# Patient Record
Sex: MALE | Race: WHITE | Employment: OTHER | URBAN - METROPOLITAN AREA
[De-identification: names, ages, dates, MRNs, and addresses within clinical notes are randomized per-mention and may not be internally consistent; named-entity substitution may affect disease eponyms.]

---

## 2017-01-01 ENCOUNTER — APPOINTMENT (OUTPATIENT)
Dept: CV DIAGNOSTICS | Facility: HOSPITAL | Age: 67
DRG: 146 | End: 2017-01-01
Attending: HOSPITALIST
Payer: MEDICARE

## 2017-01-01 ENCOUNTER — APPOINTMENT (OUTPATIENT)
Dept: CV DIAGNOSTICS | Facility: HOSPITAL | Age: 67
End: 2017-01-01
Attending: INTERNAL MEDICINE
Payer: MEDICARE

## 2017-01-01 ENCOUNTER — HOSPITAL ENCOUNTER (EMERGENCY)
Facility: HOSPITAL | Age: 67
Discharge: HOME | End: 2017-01-01
Attending: EMERGENCY MEDICINE
Payer: MEDICARE

## 2017-01-01 ENCOUNTER — APPOINTMENT (OUTPATIENT)
Dept: GENERAL RADIOLOGY | Facility: HOSPITAL | Age: 67
DRG: 146 | End: 2017-01-01
Attending: EMERGENCY MEDICINE
Payer: MEDICARE

## 2017-01-01 ENCOUNTER — APPOINTMENT (OUTPATIENT)
Dept: CT IMAGING | Facility: HOSPITAL | Age: 67
DRG: 146 | End: 2017-01-01
Attending: INTERNAL MEDICINE
Payer: MEDICARE

## 2017-01-01 ENCOUNTER — HOSPITAL ENCOUNTER (INPATIENT)
Facility: HOSPITAL | Age: 67
LOS: 4 days | Discharge: HOME | DRG: 146 | End: 2017-01-01
Attending: HOSPITALIST | Admitting: HOSPITALIST
Payer: MEDICARE

## 2017-01-01 ENCOUNTER — APPOINTMENT (OUTPATIENT)
Dept: ULTRASOUND IMAGING | Facility: HOSPITAL | Age: 67
DRG: 146 | End: 2017-01-01
Attending: INTERNAL MEDICINE
Payer: MEDICARE

## 2017-01-01 ENCOUNTER — APPOINTMENT (OUTPATIENT)
Dept: GENERAL RADIOLOGY | Facility: HOSPITAL | Age: 67
DRG: 146 | End: 2017-01-01
Attending: HOSPITALIST
Payer: MEDICARE

## 2017-01-01 ENCOUNTER — APPOINTMENT (OUTPATIENT)
Dept: ULTRASOUND IMAGING | Facility: HOSPITAL | Age: 67
DRG: 146 | End: 2017-01-01
Attending: EMERGENCY MEDICINE
Payer: MEDICARE

## 2017-01-01 ENCOUNTER — APPOINTMENT (OUTPATIENT)
Dept: GENERAL RADIOLOGY | Facility: HOSPITAL | Age: 67
End: 2017-01-01
Attending: EMERGENCY MEDICINE
Payer: MEDICARE

## 2017-01-01 ENCOUNTER — HOSPITAL ENCOUNTER (OUTPATIENT)
Facility: HOSPITAL | Age: 67
Setting detail: OBSERVATION
Discharge: HOME | End: 2017-01-01
Attending: EMERGENCY MEDICINE | Admitting: INTERNAL MEDICINE
Payer: MEDICARE

## 2017-01-01 ENCOUNTER — APPOINTMENT (OUTPATIENT)
Dept: CT IMAGING | Facility: HOSPITAL | Age: 67
DRG: 146 | End: 2017-01-01
Attending: HOSPITALIST
Payer: MEDICARE

## 2017-01-01 ENCOUNTER — APPOINTMENT (OUTPATIENT)
Dept: CT IMAGING | Facility: HOSPITAL | Age: 67
DRG: 146 | End: 2017-01-01
Attending: EMERGENCY MEDICINE
Payer: MEDICARE

## 2017-01-01 ENCOUNTER — APPOINTMENT (OUTPATIENT)
Dept: LAB | Age: 67
End: 2017-01-01
Attending: DERMATOLOGY
Payer: MEDICARE

## 2017-01-01 ENCOUNTER — APPOINTMENT (OUTPATIENT)
Dept: CT IMAGING | Facility: HOSPITAL | Age: 67
End: 2017-01-01
Attending: INTERNAL MEDICINE
Payer: MEDICARE

## 2017-01-01 ENCOUNTER — HOSPITAL ENCOUNTER (INPATIENT)
Facility: HOSPITAL | Age: 67
LOS: 5 days | Discharge: NURSING, REHAB OR LONG-TERM CARE | DRG: 146 | End: 2017-01-01
Attending: EMERGENCY MEDICINE | Admitting: INTERNAL MEDICINE
Payer: MEDICARE

## 2017-01-01 VITALS
WEIGHT: 219 LBS | BODY MASS INDEX: 28.11 KG/M2 | TEMPERATURE: 101 F | DIASTOLIC BLOOD PRESSURE: 86 MMHG | RESPIRATION RATE: 16 BRPM | HEIGHT: 74 IN | HEART RATE: 96 BPM | OXYGEN SATURATION: 98 % | SYSTOLIC BLOOD PRESSURE: 158 MMHG

## 2017-01-01 VITALS
TEMPERATURE: 99 F | BODY MASS INDEX: 26 KG/M2 | RESPIRATION RATE: 20 BRPM | OXYGEN SATURATION: 98 % | DIASTOLIC BLOOD PRESSURE: 66 MMHG | HEART RATE: 91 BPM | SYSTOLIC BLOOD PRESSURE: 137 MMHG | WEIGHT: 202.88 LBS

## 2017-01-01 VITALS
DIASTOLIC BLOOD PRESSURE: 72 MMHG | HEIGHT: 74 IN | WEIGHT: 205.19 LBS | RESPIRATION RATE: 18 BRPM | TEMPERATURE: 98 F | OXYGEN SATURATION: 98 % | BODY MASS INDEX: 26.33 KG/M2 | HEART RATE: 96 BPM | SYSTOLIC BLOOD PRESSURE: 158 MMHG

## 2017-01-01 VITALS
DIASTOLIC BLOOD PRESSURE: 94 MMHG | HEART RATE: 69 BPM | RESPIRATION RATE: 20 BRPM | HEIGHT: 74 IN | BODY MASS INDEX: 26.88 KG/M2 | WEIGHT: 209.44 LBS | TEMPERATURE: 98 F | OXYGEN SATURATION: 100 % | SYSTOLIC BLOOD PRESSURE: 163 MMHG

## 2017-01-01 DIAGNOSIS — L08.9 LOCAL INFECTION OF THE SKIN AND SUBCUTANEOUS TISSUE, UNSPECIFIED: ICD-10-CM

## 2017-01-01 DIAGNOSIS — R07.9 ACUTE CHEST PAIN: ICD-10-CM

## 2017-01-01 DIAGNOSIS — L27.0 ACNEIFORM DRUG ERUPTION: ICD-10-CM

## 2017-01-01 DIAGNOSIS — R53.1 WEAKNESS GENERALIZED: Primary | ICD-10-CM

## 2017-01-01 DIAGNOSIS — N39.0 URINARY TRACT INFECTION WITHOUT HEMATURIA, SITE UNSPECIFIED: Primary | ICD-10-CM

## 2017-01-01 DIAGNOSIS — R50.9 FEVER, UNSPECIFIED FEVER CAUSE: ICD-10-CM

## 2017-01-01 DIAGNOSIS — I47.1 SVT (SUPRAVENTRICULAR TACHYCARDIA) (HCC): Primary | ICD-10-CM

## 2017-01-01 DIAGNOSIS — D64.9 ANEMIA, UNSPECIFIED TYPE: ICD-10-CM

## 2017-01-01 DIAGNOSIS — E86.0 DEHYDRATION: ICD-10-CM

## 2017-01-01 DIAGNOSIS — I10 ESSENTIAL HYPERTENSION: ICD-10-CM

## 2017-01-01 LAB
ALBUMIN SERPL-MCNC: 3.1 G/DL (ref 3.5–4.8)
ALP LIVER SERPL-CCNC: 36 U/L (ref 45–117)
ALT SERPL-CCNC: 36 U/L (ref 17–63)
AST SERPL-CCNC: 26 U/L (ref 15–41)
ATRIAL RATE: 107 BPM
BASOPHILS # BLD AUTO: 0 X10(3) UL (ref 0–0.1)
BASOPHILS NFR BLD AUTO: 0 %
BILIRUB SERPL-MCNC: 0.4 MG/DL (ref 0.1–2)
BUN BLD-MCNC: 32 MG/DL (ref 8–20)
BUN BLD-MCNC: 32 MG/DL (ref 8–20)
CALCIUM BLD-MCNC: 7.6 MG/DL (ref 8.3–10.3)
CALCIUM BLD-MCNC: 8.2 MG/DL (ref 8.3–10.3)
CHLORIDE: 103 MMOL/L (ref 101–111)
CHLORIDE: 106 MMOL/L (ref 101–111)
CO2: 24 MMOL/L (ref 22–32)
CO2: 24 MMOL/L (ref 22–32)
CREAT BLD-MCNC: 1.04 MG/DL (ref 0.7–1.3)
CREAT BLD-MCNC: 1.06 MG/DL (ref 0.7–1.3)
EOSINOPHIL # BLD AUTO: 0 X10(3) UL (ref 0–0.3)
EOSINOPHIL NFR BLD AUTO: 0 %
ERYTHROCYTE [DISTWIDTH] IN BLOOD BY AUTOMATED COUNT: 19.6 % (ref 11.5–16)
ERYTHROCYTE [DISTWIDTH] IN BLOOD BY AUTOMATED COUNT: 19.9 % (ref 11.5–16)
GLUCOSE BLD-MCNC: 137 MG/DL (ref 70–99)
GLUCOSE BLD-MCNC: 158 MG/DL (ref 70–99)
HAV IGM SER QL: 2 MG/DL (ref 1.7–3)
HCT VFR BLD AUTO: 33.9 % (ref 37–53)
HCT VFR BLD AUTO: 37.2 % (ref 37–53)
HGB BLD-MCNC: 11.8 G/DL (ref 13–17)
HGB BLD-MCNC: 13.1 G/DL (ref 13–17)
IMMATURE GRANULOCYTE COUNT: 0.04 X10(3) UL (ref 0–1)
IMMATURE GRANULOCYTE RATIO %: 0.5 %
LYMPHOCYTES # BLD AUTO: 1.48 X10(3) UL (ref 0.9–4)
LYMPHOCYTES NFR BLD AUTO: 19.9 %
M PROTEIN MFR SERPL ELPH: 5.9 G/DL (ref 6.1–8.3)
MCH RBC QN AUTO: 33.4 PG (ref 27–33.2)
MCH RBC QN AUTO: 33.7 PG (ref 27–33.2)
MCHC RBC AUTO-ENTMCNC: 34.8 G/DL (ref 31–37)
MCHC RBC AUTO-ENTMCNC: 35.2 G/DL (ref 31–37)
MCV RBC AUTO: 95.6 FL (ref 80–99)
MCV RBC AUTO: 96 FL (ref 80–99)
MONOCYTES # BLD AUTO: 0.19 X10(3) UL (ref 0.1–0.6)
MONOCYTES NFR BLD AUTO: 2.6 %
MORPHOLOGY: NORMAL
NEUTROPHIL ABS PRELIM: 5.73 X10 (3) UL (ref 1.3–6.7)
NEUTROPHILS # BLD AUTO: 5.73 X10(3) UL (ref 1.3–6.7)
NEUTROPHILS NFR BLD AUTO: 77 %
P AXIS: 66 DEGREES
P-R INTERVAL: 146 MS
PLATELET # BLD AUTO: 143 10(3)UL (ref 150–450)
PLATELET # BLD AUTO: 154 10(3)UL (ref 150–450)
PLATELET MORPHOLOGY: NORMAL
POTASSIUM SERPL-SCNC: 3.6 MMOL/L (ref 3.6–5.1)
POTASSIUM SERPL-SCNC: 3.6 MMOL/L (ref 3.6–5.1)
POTASSIUM SERPL-SCNC: 3.7 MMOL/L (ref 3.6–5.1)
Q-T INTERVAL: 362 MS
QRS DURATION: 76 MS
QTC CALCULATION (BEZET): 483 MS
R AXIS: 36 DEGREES
RBC # BLD AUTO: 3.53 X10(6)UL (ref 3.8–5.8)
RBC # BLD AUTO: 3.89 X10(6)UL (ref 3.8–5.8)
RED CELL DISTRIBUTION WIDTH-SD: 66.4 FL (ref 35.1–46.3)
RED CELL DISTRIBUTION WIDTH-SD: 67.7 FL (ref 35.1–46.3)
SODIUM SERPL-SCNC: 137 MMOL/L (ref 136–144)
SODIUM SERPL-SCNC: 141 MMOL/L (ref 136–144)
T AXIS: 50 DEGREES
TROPONIN: 0.07 NG/ML (ref ?–0.05)
TROPONIN: 0.1 NG/ML (ref ?–0.05)
TROPONIN: <0.046 NG/ML (ref ?–0.05)
TSI SER-ACNC: 1.87 MIU/ML (ref 0.35–5.5)
VENTRICULAR RATE: 107 BPM
WBC # BLD AUTO: 5.8 X10(3) UL (ref 4–13)
WBC # BLD AUTO: 7.4 X10(3) UL (ref 4–13)

## 2017-01-01 PROCEDURE — 83735 ASSAY OF MAGNESIUM: CPT | Performed by: INTERNAL MEDICINE

## 2017-01-01 PROCEDURE — 99285 EMERGENCY DEPT VISIT HI MDM: CPT

## 2017-01-01 PROCEDURE — 93306 TTE W/DOPPLER COMPLETE: CPT | Performed by: HOSPITALIST

## 2017-01-01 PROCEDURE — 97161 PT EVAL LOW COMPLEX 20 MIN: CPT

## 2017-01-01 PROCEDURE — 83690 ASSAY OF LIPASE: CPT | Performed by: EMERGENCY MEDICINE

## 2017-01-01 PROCEDURE — 83735 ASSAY OF MAGNESIUM: CPT | Performed by: EMERGENCY MEDICINE

## 2017-01-01 PROCEDURE — 82746 ASSAY OF FOLIC ACID SERUM: CPT | Performed by: PHYSICIAN ASSISTANT

## 2017-01-01 PROCEDURE — 93970 EXTREMITY STUDY: CPT | Performed by: EMERGENCY MEDICINE

## 2017-01-01 PROCEDURE — 71275 CT ANGIOGRAPHY CHEST: CPT

## 2017-01-01 PROCEDURE — 93018 CV STRESS TEST I&R ONLY: CPT | Performed by: INTERNAL MEDICINE

## 2017-01-01 PROCEDURE — 85025 COMPLETE CBC W/AUTO DIFF WBC: CPT | Performed by: EMERGENCY MEDICINE

## 2017-01-01 PROCEDURE — 82607 VITAMIN B-12: CPT | Performed by: PHYSICIAN ASSISTANT

## 2017-01-01 PROCEDURE — 83735 ASSAY OF MAGNESIUM: CPT | Performed by: HOSPITALIST

## 2017-01-01 PROCEDURE — 87493 C DIFF AMPLIFIED PROBE: CPT | Performed by: HOSPITALIST

## 2017-01-01 PROCEDURE — 93306 TTE W/DOPPLER COMPLETE: CPT | Performed by: INTERNAL MEDICINE

## 2017-01-01 PROCEDURE — 93306 TTE W/DOPPLER COMPLETE: CPT

## 2017-01-01 PROCEDURE — 76700 US EXAM ABDOM COMPLETE: CPT | Performed by: INTERNAL MEDICINE

## 2017-01-01 PROCEDURE — 84132 ASSAY OF SERUM POTASSIUM: CPT | Performed by: HOSPITALIST

## 2017-01-01 PROCEDURE — 93005 ELECTROCARDIOGRAM TRACING: CPT

## 2017-01-01 PROCEDURE — 85027 COMPLETE CBC AUTOMATED: CPT | Performed by: HOSPITALIST

## 2017-01-01 PROCEDURE — 87086 URINE CULTURE/COLONY COUNT: CPT | Performed by: EMERGENCY MEDICINE

## 2017-01-01 PROCEDURE — 74177 CT ABD & PELVIS W/CONTRAST: CPT | Performed by: EMERGENCY MEDICINE

## 2017-01-01 PROCEDURE — 87040 BLOOD CULTURE FOR BACTERIA: CPT | Performed by: HOSPITALIST

## 2017-01-01 PROCEDURE — 71010 XR CHEST AP PORTABLE  (CPT=71010): CPT

## 2017-01-01 PROCEDURE — 84484 ASSAY OF TROPONIN QUANT: CPT | Performed by: HOSPITALIST

## 2017-01-01 PROCEDURE — 85025 COMPLETE CBC W/AUTO DIFF WBC: CPT | Performed by: HOSPITALIST

## 2017-01-01 PROCEDURE — 93010 ELECTROCARDIOGRAM REPORT: CPT | Performed by: INTERNAL MEDICINE

## 2017-01-01 PROCEDURE — 93010 ELECTROCARDIOGRAM REPORT: CPT

## 2017-01-01 PROCEDURE — 81001 URINALYSIS AUTO W/SCOPE: CPT | Performed by: EMERGENCY MEDICINE

## 2017-01-01 PROCEDURE — 87070 CULTURE OTHR SPECIMN AEROBIC: CPT

## 2017-01-01 PROCEDURE — 36415 COLL VENOUS BLD VENIPUNCTURE: CPT

## 2017-01-01 PROCEDURE — 74177 CT ABD & PELVIS W/CONTRAST: CPT | Performed by: INTERNAL MEDICINE

## 2017-01-01 PROCEDURE — 71010 XR CHEST AP PORTABLE  (CPT=71010): CPT | Performed by: HOSPITALIST

## 2017-01-01 PROCEDURE — 85018 HEMOGLOBIN: CPT | Performed by: HOSPITALIST

## 2017-01-01 PROCEDURE — 71260 CT THORAX DX C+: CPT | Performed by: INTERNAL MEDICINE

## 2017-01-01 PROCEDURE — 93971 EXTREMITY STUDY: CPT | Performed by: INTERNAL MEDICINE

## 2017-01-01 PROCEDURE — 78452 HT MUSCLE IMAGE SPECT MULT: CPT

## 2017-01-01 PROCEDURE — 84443 ASSAY THYROID STIM HORMONE: CPT | Performed by: HOSPITALIST

## 2017-01-01 PROCEDURE — 99222 1ST HOSP IP/OBS MODERATE 55: CPT | Performed by: OTHER

## 2017-01-01 PROCEDURE — 93017 CV STRESS TEST TRACING ONLY: CPT

## 2017-01-01 PROCEDURE — 87040 BLOOD CULTURE FOR BACTERIA: CPT | Performed by: EMERGENCY MEDICINE

## 2017-01-01 PROCEDURE — 80048 BASIC METABOLIC PNL TOTAL CA: CPT | Performed by: HOSPITALIST

## 2017-01-01 PROCEDURE — 70486 CT MAXILLOFACIAL W/O DYE: CPT | Performed by: HOSPITALIST

## 2017-01-01 PROCEDURE — 81001 URINALYSIS AUTO W/SCOPE: CPT | Performed by: HOSPITALIST

## 2017-01-01 PROCEDURE — 96361 HYDRATE IV INFUSION ADD-ON: CPT

## 2017-01-01 PROCEDURE — 71275 CT ANGIOGRAPHY CHEST: CPT | Performed by: EMERGENCY MEDICINE

## 2017-01-01 PROCEDURE — 80076 HEPATIC FUNCTION PANEL: CPT | Performed by: EMERGENCY MEDICINE

## 2017-01-01 PROCEDURE — 87205 SMEAR GRAM STAIN: CPT

## 2017-01-01 PROCEDURE — 82550 ASSAY OF CK (CPK): CPT | Performed by: HOSPITALIST

## 2017-01-01 PROCEDURE — 80053 COMPREHEN METABOLIC PANEL: CPT | Performed by: INTERNAL MEDICINE

## 2017-01-01 PROCEDURE — 87086 URINE CULTURE/COLONY COUNT: CPT | Performed by: HOSPITALIST

## 2017-01-01 PROCEDURE — 84132 ASSAY OF SERUM POTASSIUM: CPT | Performed by: INTERNAL MEDICINE

## 2017-01-01 PROCEDURE — 84145 PROCALCITONIN (PCT): CPT | Performed by: HOSPITALIST

## 2017-01-01 PROCEDURE — 71020 XR CHEST PA + LAT CHEST (CPT=71020): CPT | Performed by: EMERGENCY MEDICINE

## 2017-01-01 PROCEDURE — 80048 BASIC METABOLIC PNL TOTAL CA: CPT | Performed by: EMERGENCY MEDICINE

## 2017-01-01 PROCEDURE — 84484 ASSAY OF TROPONIN QUANT: CPT | Performed by: EMERGENCY MEDICINE

## 2017-01-01 PROCEDURE — 80053 COMPREHEN METABOLIC PANEL: CPT | Performed by: EMERGENCY MEDICINE

## 2017-01-01 PROCEDURE — 96360 HYDRATION IV INFUSION INIT: CPT

## 2017-01-01 PROCEDURE — 84484 ASSAY OF TROPONIN QUANT: CPT | Performed by: INTERNAL MEDICINE

## 2017-01-01 PROCEDURE — 71010 XR CHEST AP PORTABLE  (CPT=71010): CPT | Performed by: EMERGENCY MEDICINE

## 2017-01-01 RX ORDER — ONDANSETRON 2 MG/ML
4 INJECTION INTRAMUSCULAR; INTRAVENOUS EVERY 6 HOURS PRN
Status: DISCONTINUED | OUTPATIENT
Start: 2017-01-01 | End: 2017-01-01

## 2017-01-01 RX ORDER — ASPIRIN 81 MG/1
324 TABLET, CHEWABLE ORAL ONCE
Status: COMPLETED | OUTPATIENT
Start: 2017-01-01 | End: 2017-01-01

## 2017-01-01 RX ORDER — SODIUM CHLORIDE 9 MG/ML
INJECTION, SOLUTION INTRAVENOUS CONTINUOUS
Status: DISCONTINUED | OUTPATIENT
Start: 2017-01-01 | End: 2017-01-01

## 2017-01-01 RX ORDER — LORAZEPAM 0.5 MG/1
0.5 TABLET ORAL EVERY 6 HOURS PRN
Status: DISCONTINUED | OUTPATIENT
Start: 2017-01-01 | End: 2017-01-01

## 2017-01-01 RX ORDER — METOPROLOL SUCCINATE 50 MG/1
50 TABLET, EXTENDED RELEASE ORAL
Status: DISCONTINUED | OUTPATIENT
Start: 2017-01-01 | End: 2017-01-01

## 2017-01-01 RX ORDER — SODIUM CHLORIDE 5 %
1 OINTMENT (GRAM) OPHTHALMIC (EYE) NIGHTLY
Status: DISCONTINUED | OUTPATIENT
Start: 2017-01-01 | End: 2017-01-01

## 2017-01-01 RX ORDER — GABAPENTIN 300 MG/1
300 CAPSULE ORAL 3 TIMES DAILY
Status: DISCONTINUED | OUTPATIENT
Start: 2017-01-01 | End: 2017-01-01

## 2017-01-01 RX ORDER — CALCIUM CARBONATE/VITAMIN D3 600 MG-10
1 TABLET ORAL DAILY
COMMUNITY

## 2017-01-01 RX ORDER — TRAZODONE HYDROCHLORIDE 50 MG/1
50 TABLET ORAL NIGHTLY PRN
Status: DISCONTINUED | OUTPATIENT
Start: 2017-01-01 | End: 2017-01-01

## 2017-01-01 RX ORDER — MAGNESIUM OXIDE 400 MG (241.3 MG MAGNESIUM) TABLET
400 TABLET 3 TIMES DAILY
Status: DISCONTINUED | OUTPATIENT
Start: 2017-01-01 | End: 2017-01-01

## 2017-01-01 RX ORDER — LORAZEPAM 0.5 MG/1
0.5 TABLET ORAL NIGHTLY PRN
Status: DISCONTINUED | OUTPATIENT
Start: 2017-01-01 | End: 2017-01-01

## 2017-01-01 RX ORDER — LORAZEPAM 0.5 MG/1
0.5 TABLET ORAL EVERY 6 HOURS PRN
Qty: 20 TABLET | Refills: 0 | Status: SHIPPED | OUTPATIENT
Start: 2017-01-01 | End: 2017-01-01

## 2017-01-01 RX ORDER — HYDROCODONE BITARTRATE AND ACETAMINOPHEN 5; 325 MG/1; MG/1
1 TABLET ORAL EVERY 6 HOURS PRN
Status: DISCONTINUED | OUTPATIENT
Start: 2017-01-01 | End: 2017-01-01

## 2017-01-01 RX ORDER — MAGNESIUM OXIDE 400 MG (241.3 MG MAGNESIUM) TABLET
400 TABLET ONCE
Status: COMPLETED | OUTPATIENT
Start: 2017-01-01 | End: 2017-01-01

## 2017-01-01 RX ORDER — MIRTAZAPINE 7.5 MG/1
7.5 TABLET, FILM COATED ORAL NIGHTLY
Qty: 30 TABLET | Refills: 0 | Status: SHIPPED | OUTPATIENT
Start: 2017-01-01

## 2017-01-01 RX ORDER — ONDANSETRON 4 MG/1
4 TABLET, ORALLY DISINTEGRATING ORAL EVERY 6 HOURS PRN
Status: DISCONTINUED | OUTPATIENT
Start: 2017-01-01 | End: 2017-01-01

## 2017-01-01 RX ORDER — METOPROLOL TARTRATE 5 MG/5ML
5 INJECTION INTRAVENOUS EVERY 6 HOURS
Status: DISCONTINUED | OUTPATIENT
Start: 2017-01-01 | End: 2017-01-01

## 2017-01-01 RX ORDER — BENZONATATE 200 MG/1
200 CAPSULE ORAL ONCE
Status: COMPLETED | OUTPATIENT
Start: 2017-01-01 | End: 2017-01-01

## 2017-01-01 RX ORDER — ACETAMINOPHEN 500 MG
1000 TABLET ORAL ONCE
Status: COMPLETED | OUTPATIENT
Start: 2017-01-01 | End: 2017-01-01

## 2017-01-01 RX ORDER — ONDANSETRON 2 MG/ML
8 INJECTION INTRAMUSCULAR; INTRAVENOUS
Status: DISCONTINUED | OUTPATIENT
Start: 2017-01-01 | End: 2017-01-01

## 2017-01-01 RX ORDER — LANSOPRAZOLE 15 MG/1
15 CAPSULE, DELAYED RELEASE ORAL
Qty: 60 CAPSULE | Refills: 1 | Status: SHIPPED | OUTPATIENT
Start: 2017-01-01

## 2017-01-01 RX ORDER — AMOXICILLIN AND CLAVULANATE POTASSIUM 875; 125 MG/1; MG/1
1 TABLET, FILM COATED ORAL 2 TIMES DAILY
Status: DISCONTINUED | OUTPATIENT
Start: 2017-01-01 | End: 2017-01-01

## 2017-01-01 RX ORDER — ACETAMINOPHEN 325 MG/1
650 TABLET ORAL EVERY 6 HOURS PRN
Status: DISCONTINUED | OUTPATIENT
Start: 2017-01-01 | End: 2017-01-01

## 2017-01-01 RX ORDER — PANTOPRAZOLE SODIUM 40 MG/1
40 TABLET, DELAYED RELEASE ORAL
Status: DISCONTINUED | OUTPATIENT
Start: 2017-01-01 | End: 2017-01-01

## 2017-01-01 RX ORDER — GABAPENTIN 100 MG/1
100 CAPSULE ORAL 3 TIMES DAILY PRN
Status: DISCONTINUED | OUTPATIENT
Start: 2017-01-01 | End: 2017-01-01

## 2017-01-01 RX ORDER — CODEINE PHOSPHATE AND GUAIFENESIN 10; 100 MG/5ML; MG/5ML
5 SOLUTION ORAL EVERY 4 HOURS PRN
Status: DISCONTINUED | OUTPATIENT
Start: 2017-01-01 | End: 2017-01-01

## 2017-01-01 RX ORDER — FUROSEMIDE 20 MG/1
20 TABLET ORAL DAILY
Status: DISCONTINUED | OUTPATIENT
Start: 2017-01-01 | End: 2017-01-01

## 2017-01-01 RX ORDER — SODIUM CHLORIDE 9 MG/ML
INJECTION, SOLUTION INTRAVENOUS CONTINUOUS
Status: CANCELLED | OUTPATIENT
Start: 2017-01-01 | End: 2017-01-01

## 2017-01-01 RX ORDER — ASPIRIN 325 MG
325 TABLET, DELAYED RELEASE (ENTERIC COATED) ORAL DAILY
Status: DISCONTINUED | OUTPATIENT
Start: 2017-01-01 | End: 2017-01-01

## 2017-01-01 RX ORDER — POTASSIUM CHLORIDE 14.9 MG/ML
20 INJECTION INTRAVENOUS ONCE
Status: COMPLETED | OUTPATIENT
Start: 2017-01-01 | End: 2017-01-01

## 2017-01-01 RX ORDER — HEPARIN SODIUM 5000 [USP'U]/ML
5000 INJECTION, SOLUTION INTRAVENOUS; SUBCUTANEOUS EVERY 8 HOURS SCHEDULED
Status: DISCONTINUED | OUTPATIENT
Start: 2017-01-01 | End: 2017-01-01

## 2017-01-01 RX ORDER — MIRTAZAPINE 15 MG/1
7.5 TABLET, FILM COATED ORAL NIGHTLY
Status: DISCONTINUED | OUTPATIENT
Start: 2017-01-01 | End: 2017-01-01

## 2017-01-01 RX ORDER — CLONIDINE HYDROCHLORIDE 0.1 MG/1
0.1 TABLET ORAL 3 TIMES DAILY PRN
Status: DISCONTINUED | OUTPATIENT
Start: 2017-01-01 | End: 2017-01-01

## 2017-01-01 RX ORDER — POTASSIUM CHLORIDE 20 MEQ/1
40 TABLET, EXTENDED RELEASE ORAL ONCE
Status: COMPLETED | OUTPATIENT
Start: 2017-01-01 | End: 2017-01-01

## 2017-01-01 RX ORDER — POTASSIUM CHLORIDE 20 MEQ/1
40 TABLET, EXTENDED RELEASE ORAL EVERY 4 HOURS
Status: COMPLETED | OUTPATIENT
Start: 2017-01-01 | End: 2017-01-01

## 2017-01-01 RX ORDER — METOCLOPRAMIDE HYDROCHLORIDE 5 MG/ML
10 INJECTION INTRAMUSCULAR; INTRAVENOUS EVERY 8 HOURS PRN
Status: DISCONTINUED | OUTPATIENT
Start: 2017-01-01 | End: 2017-01-01

## 2017-01-01 RX ORDER — METOPROLOL SUCCINATE 50 MG/1
50 TABLET, EXTENDED RELEASE ORAL
Qty: 60 TABLET | Refills: 3 | Status: SHIPPED | OUTPATIENT
Start: 2017-01-01 | End: 2017-01-01 | Stop reason: DRUGHIGH

## 2017-01-01 RX ORDER — CLONIDINE HYDROCHLORIDE 0.1 MG/1
0.1 TABLET ORAL 2 TIMES DAILY
Status: DISCONTINUED | OUTPATIENT
Start: 2017-01-01 | End: 2017-01-01

## 2017-01-01 RX ORDER — MAGNESIUM OXIDE 400 MG (241.3 MG MAGNESIUM) TABLET
400 TABLET 2 TIMES DAILY
Status: DISCONTINUED | OUTPATIENT
Start: 2017-01-01 | End: 2017-01-01

## 2017-01-01 RX ORDER — POTASSIUM CHLORIDE 20 MEQ/1
40 TABLET, EXTENDED RELEASE ORAL ONCE
Status: DISCONTINUED | OUTPATIENT
Start: 2017-01-01 | End: 2017-01-01

## 2017-01-01 RX ORDER — BENZONATATE 200 MG/1
200 CAPSULE ORAL 2 TIMES DAILY PRN
Status: DISCONTINUED | OUTPATIENT
Start: 2017-01-01 | End: 2017-01-01

## 2017-01-01 RX ORDER — GUAIFENESIN 600 MG
600 TABLET, EXTENDED RELEASE 12 HR ORAL EVERY 12 HOURS
Status: DISCONTINUED | OUTPATIENT
Start: 2017-01-01 | End: 2017-01-01

## 2017-01-01 RX ORDER — PANTOPRAZOLE SODIUM 20 MG/1
20 TABLET, DELAYED RELEASE ORAL
Status: DISCONTINUED | OUTPATIENT
Start: 2017-01-01 | End: 2017-01-01

## 2017-01-01 RX ORDER — SODIUM CHLORIDE 9 MG/ML
1000 INJECTION, SOLUTION INTRAVENOUS ONCE
Status: COMPLETED | OUTPATIENT
Start: 2017-01-01 | End: 2017-01-01

## 2017-01-01 RX ORDER — MAGNESIUM OXIDE 400 MG (241.3 MG MAGNESIUM) TABLET
800 TABLET ONCE
Status: COMPLETED | OUTPATIENT
Start: 2017-01-01 | End: 2017-01-01

## 2017-01-01 RX ORDER — POTASSIUM CHLORIDE 20 MEQ/1
40 TABLET, EXTENDED RELEASE ORAL EVERY 4 HOURS
Status: DISCONTINUED | OUTPATIENT
Start: 2017-01-01 | End: 2017-01-01

## 2017-01-01 RX ORDER — HYDROCODONE BITARTRATE AND ACETAMINOPHEN 5; 325 MG/1; MG/1
1-2 TABLET ORAL EVERY 6 HOURS PRN
Qty: 30 TABLET | Refills: 0 | Status: SHIPPED | OUTPATIENT
Start: 2017-01-01

## 2017-01-01 RX ORDER — DOXYCYCLINE HYCLATE 100 MG/1
100 CAPSULE ORAL 2 TIMES DAILY
Status: DISCONTINUED | OUTPATIENT
Start: 2017-01-01 | End: 2017-01-01

## 2017-01-01 RX ORDER — ENOXAPARIN SODIUM 100 MG/ML
40 INJECTION SUBCUTANEOUS DAILY
Status: DISCONTINUED | OUTPATIENT
Start: 2017-01-01 | End: 2017-01-01

## 2017-01-01 RX ORDER — METOPROLOL SUCCINATE 100 MG/1
100 TABLET, EXTENDED RELEASE ORAL
Status: DISCONTINUED | OUTPATIENT
Start: 2017-01-01 | End: 2017-01-01

## 2017-01-01 RX ORDER — SULFAMETHOXAZOLE AND TRIMETHOPRIM 800; 160 MG/1; MG/1
1 TABLET ORAL 2 TIMES DAILY
Qty: 14 TABLET | Refills: 0 | Status: SHIPPED | OUTPATIENT
Start: 2017-01-01 | End: 2017-01-01

## 2017-01-01 RX ORDER — POTASSIUM CHLORIDE 29.8 MG/ML
40 INJECTION INTRAVENOUS ONCE
Status: COMPLETED | OUTPATIENT
Start: 2017-01-01 | End: 2017-01-01

## 2017-01-01 RX ORDER — CLONIDINE HYDROCHLORIDE 0.1 MG/1
TABLET ORAL
Qty: 90 TABLET | Refills: 3 | Status: SHIPPED | COMMUNITY
Start: 2017-01-01 | End: 2017-01-01

## 2017-01-01 RX ORDER — ONDANSETRON 4 MG/1
8 TABLET, FILM COATED ORAL EVERY 8 HOURS PRN
Status: DISCONTINUED | OUTPATIENT
Start: 2017-01-01 | End: 2017-01-01

## 2017-01-01 RX ORDER — MELATONIN
500
Status: DISCONTINUED | OUTPATIENT
Start: 2017-01-01 | End: 2017-01-01

## 2017-01-01 RX ORDER — ONDANSETRON 2 MG/ML
4 INJECTION INTRAMUSCULAR; INTRAVENOUS EVERY 4 HOURS PRN
Status: CANCELLED | OUTPATIENT
Start: 2017-01-01

## 2017-01-01 RX ORDER — DIPHENHYDRAMINE HCL 25 MG
25 CAPSULE ORAL NIGHTLY PRN
Status: DISCONTINUED | OUTPATIENT
Start: 2017-01-01 | End: 2017-01-01

## 2017-01-16 PROBLEM — I47.1 SVT (SUPRAVENTRICULAR TACHYCARDIA) (HCC): Status: ACTIVE | Noted: 2017-01-01

## 2017-01-17 PROBLEM — R07.9 ACUTE CHEST PAIN: Status: ACTIVE | Noted: 2017-01-01

## 2017-01-17 NOTE — H&P
[facility] Hospitalist History and Physical      CC: Chest pain     PCP: Lily Valdez MD      History of Present Illness: Patient is a 73year old male with PMH sig for HTN, metastatic head and neck CA on chemo who presents for left sided chest pain x 1 day. 8/15/2014    Comment Procedure: LUMBAR EPIDURAL;  Surgeon: Diego Hurtado MD;  Location: [address] [place] [address] BY [facility] [address] .Critical access hospital [facility]  [facility] 5[facility] YR N/A 8/15/2014    Comment Procedure: LUMBAR EPIDURAL;  Surgeon: Diego Hurtado MD;  Location:  mouth 3 (three) times daily as needed (As directed by Dr. Archie Wallace). Disp: 90 tablet Rfl: 3   Doxycycline Hyclate 100 MG Oral Tab Take 1 tablet (100 mg total) by mouth 2 (two) times daily.  Disp: 60 tablet Rfl: 5   triamcinolone acetonide 0.1 % External Cre HCl (ZOFRAN) 8 MG tablet Take 1 tablet (8 mg total) by mouth every 8 (eight) hours as needed for Nausea. Disp: 20 tablet Rfl: 3   LORazepam 0.5 MG Oral Tab Take 1 tab every 6 hrs if needed for anxiety/difficulty sleeping on the days you take dexamethasone. Skin: Skin color, texture, turgor normal. No rashes or lesions.     Neurologic: Normal strength, no focal deficit appreciated     Data Review:    LABS:     Lab Results  Component Value Date   WBC 5.8 01/17/2017   HGB 11.8 01/17/2017   HCT 33.9 01/17/2017 atelectatic lung. Medial left upper lobe 4.9 mm linear subpleural opacity may be scar. There is a clustering of minute nodules in the lateral left lower lobe axial image 173 suggesting peribronchial inflammation.  A metastatic lesion in the superior and ale silhouette and pulmonary vasculature are unremarkable. No consolidation, pleural effusion or pneumothorax. 1/17/2017  CONCLUSION:   Left-sided central line has tip pointing cranially in the SVC.   The results of this exam were discussed with Dr. Wilson Quiñones

## 2017-01-17 NOTE — CONSULTS
[facility] Cardiology Consultation    Destinee Padilla Patient Status:  Observation    7/3/1950 MRN [id number]   [facility] 8NE-A Attending Abbey Kaufman MD   [facility] Day # 1 PCP Hannah Weaver MD     Reason for Consultation:  Chest pain, ta LUMBAR EPIDURAL;  Surgeon: Luana Gamboa MD;  Location: [address] [place] [address] NDL/CATH SPI DX/THER Sam Tony Salomón 85 N/A 8/15/2014    Comment Procedure: LUMBAR EPIDURAL;  Surgeon: Luana Gamboa MD;  Location: [address] [place] [address] Sister      Breast Cancer   • Cancer Maternal Grandmother      Breast Cancer   • Hypertension Sister      Controlled   • Hypertension Sister      Controlled   • Diabetes Mother       of complications         Allergies:  No Known Allergies    Medication CREATSERUM  1.04  1.06   CA  8.2*  7.6*   MG   --   2.0   GLU  158*  137*       Recent Labs   Lab  01/16/17 2331   ALT  36   AST  26   ALB  3.1*       No results for input(s): PTP, INR in the last 72 hours.     Recent Labs   Lab  01/16/17 2331 01/17/

## 2017-01-17 NOTE — ED PROVIDER NOTES
Patient Seen in: [facility] Emergency Department    History   No chief complaint on file. Stated Complaint: svt - converted    HPI    Patient is a 71-year-old male comes in to emergency room for evaluation of chest tightness and arrhythmia.   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 Kylah Dwyer).    Doxycycline Hyclate 100 MG Oral Tab,  Take 1 tablet (100 m 0.5 MG Oral Tab,  Take 1 tab every 6 hrs if needed for anxiety/difficulty sleeping on the days you take dexamethasone. Cholecalciferol (VITAMIN D) 1000 UNITS Oral Tab,  Take 2 tablets by mouth 2 (two) times daily.          Family History   Problem Relatio Normal S1S2. No S3S4 or murmur. ABDOMEN: Bowel sounds are present. Soft. nondistended, no pulsatile masses. nontender    MUSCULOSKELETAL: No calf tenderness. Dorsalis and Posterior Tibial pulses present. No clubbing. No cyanosis.   1+ lower extremity lee obtained. COMPARISON:  STEPHANIE , LIDYA CHEST AP PORTABLE  (CPT=71010), 12/14/2016, 11:31.   INDICATIONS:  svt - converted  PATIENT STATED HISTORY:  Patient presents with left sided chest tightness and a racing pulse that occurred at 1900 and then 2100 this ju

## 2017-01-17 NOTE — PLAN OF CARE
CARDIOVASCULAR - ADULT    • Maintains optimal cardiac output and hemodynamic stability Adequate for Discharge    • Absence of cardiac arrhythmias or at baseline Adequate for Discharge        PAIN - ADULT    • Verbalizes/displays adequate comfort level or p

## 2017-01-17 NOTE — PLAN OF CARE
Received pt from er. Chief complaint of chest tightness earlier tonight. Ems called. Found to be on svt. Vagal manuevers done by ems. Pt converted to nsr. Since arrival in er, pt been on nsr and chest pain free. Recently finished chemo yesterday.  Pt declin

## 2017-01-17 NOTE — PLAN OF CARE
CARDIOVASCULAR - ADULT    • Maintains optimal cardiac output and hemodynamic stability Progressing    • Absence of cardiac arrhythmias or at baseline Progressing        PAIN - ADULT    • Verbalizes/displays adequate comfort level or patient's stated pain g

## 2017-01-17 NOTE — ED NOTES
Preparing to access port when pt inquires if peripheral IV could be used. Pt just had port deaccessed earlier today.   Ok to do peripheral IV per Dr. Lua

## 2017-01-17 NOTE — PROGRESS NOTES
01/17/17 0511 01/17/17 0514 01/17/17 0515   Vital Signs   Pulse 95 106 112   Heart Rate Source Monitor Monitor Monitor   Cardiac Rhythm NSR --  --    Resp 20 --  --    Respiratory Quality Normal --  --    /76 mmHg 96/78 mmHg (!) 87/63 mmHg   BP Lo

## 2017-01-17 NOTE — PROGRESS NOTES
01/17/17 0100 01/17/17 0102 01/17/17 0103   Vital Signs   Pulse 100 101 --    Heart Rate Source Radial --  --    Cardiac Rhythm NSR --  --    Resp 20 --  --    Respiratory Quality Normal --  --    /75 mmHg 115/84 mmHg (!) 86/68 mmHg   BP Location

## 2017-02-07 PROBLEM — G08 CEREBRAL VENOUS SINUS THROMBOSIS: Status: ACTIVE | Noted: 2017-01-01

## 2017-02-09 PROBLEM — G08 CEREBRAL VENOUS SINUS THROMBOSIS: Status: ACTIVE | Noted: 2017-01-01

## 2017-02-22 PROBLEM — I82.90 THROMBOSIS: Status: ACTIVE | Noted: 2017-01-01

## 2017-04-13 PROBLEM — E83.42 HYPOMAGNESEMIA: Status: ACTIVE | Noted: 2017-01-01

## 2017-05-22 PROBLEM — R74.8 ELEVATED LIVER ENZYMES: Status: ACTIVE | Noted: 2017-01-01

## 2017-05-25 PROBLEM — H10.31 ACUTE CONJUNCTIVITIS OF RIGHT EYE: Status: ACTIVE | Noted: 2017-01-01

## 2017-05-25 PROBLEM — Z71.9 HEALTH EDUCATION/COUNSELING: Status: ACTIVE | Noted: 2017-01-01

## 2017-05-31 NOTE — ED NOTES
Bedside report received. Patient and family aware and agree with plan, aware we need a urine sample.     Side rails x 2 up, call light in reach

## 2017-05-31 NOTE — ED INITIAL ASSESSMENT (HPI)
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 Some loose stool each morning x one week.  Dry cough+  Interm nausea+

## 2017-05-31 NOTE — ED PROVIDER NOTES
Patient Seen in: [facility] Emergency Department    History   Patient presents with:  Fever (infectious)    Stated Complaint: FEVER    HPI    41-year-old male currently undergoing chemotherapy for metastatic bladder cancer presents with fevers to 10 DX/THERAPEUTIC SUBSTANCE, EPIDURAL/SUBARACHNOID; LUMBAR/SACRAL N/A 8/15/2014    Comment Procedure: LUMBAR EPIDURAL;  Surgeon: Lauren Danielle MD;  Location: [address] [place] [address] NDL/CATH SPI DX/THER Sam Tony Lorenzana 85 N/A 8/15/2014    Comm Medications :   Sulfamethoxazole-TMP -160 MG Oral Tab per tablet,  Take 1 tablet by mouth 2 (two) times daily. erythromycin 5 MG/GM Ophthalmic Ointment,  Place 1 Application into the right eye every 6 (six) hours.    Metoprolol Succinate ER (T Take 1 tablet (8 mg total) by mouth every 8 (eight) hours as needed for Nausea.   lidocaine-prilocaine (EMLA) 2.5-2.5 % External Cream,  Use as directed, apply thin layer over mediport site, cover, 1 hour prior to treatment   LORazepam 0.5 MG Oral Tab,  Ta Ht 188 cm (6' 2\")  Wt 99.338 kg  BMI 28.11 kg/m2  SpO2 98%        Physical Exam    General:  Vitals as listed. No acute distress   HEENT: Sclerae anicteric. Conjunctivae show no pallor.   Oropharynx clear, mucous membranes moist   Neck: supple, no rigidi ---------                               -----------         ------                     CBC W/ DIFFERENTIAL[[id number]]          Abnormal            Final result                 Please view results for these tests on the individual orders.    RAINBOW DRAW B medications    Sulfamethoxazole-TMP -160 MG Oral Tab per tablet  Take 1 tablet by mouth 2 (two) times daily.   Qty: 14 tablet Refills: 0

## 2017-05-31 NOTE — ED NOTES
Patient and wife updated on results, aware we are awaiting UA results. Patient has no further needs at this time.

## 2017-06-12 PROBLEM — R19.7 DIARRHEA: Status: ACTIVE | Noted: 2017-01-01

## 2017-06-12 NOTE — PLAN OF CARE
Patient alert and oriented x4. Patient denies pain during admission. Patient stated he had previous left sided chest pain on the way over to the hospital. Dr. Ni Vieira notified and ordered EKG and troponin level.  Patient admitted due to recent fevers, nausea, a

## 2017-06-12 NOTE — H&P
General Medicine H&P     weakness     PCP: Luh Hansen MD    History of Present Illness: Patient is a 73year old male with PMH sig for metastatic head and neck cancer presented w/ weakness.  Pt is s/p chemotherapy w/ initial response, then noted recu 2011    Comment DIVERTICULOSIS-=- NO POLYP    COLONOSCOPY  2005 AND 2003    Comment POLYPS    OTHER SURGICAL HISTORY  2002-4    Comment BLADDER CA    OTHER SURGICAL HISTORY      Comment DENTAL IMPLANT    OTHER SURGICAL HISTORY  2011 X 2    Comment BLADDER 11/12/14 - PRAKASH Goyal    UPPER GI ENDOSCOPY - REFERRAL  11/12/14 - PRAKASH Goyal    Comment normal EGD    COLONOSCOPY N/A 11/12/2014    Comment TJeri Estimable. 2 adenomas, diverticuli, hemorrhoids, repeat 2019.     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 He has had a fever of 103 since last night. He also statyes he has a cough. FINDINGS:  LUNGS:  Left-sided Port-A-Cath projecting the SVC.  No pneum There is a heterogeneous appearance of the liver due to multiple hypoattenuating, metastatic lesions. One is a metastasis in the left lobe measures 33.8 mm. One of the largest right hepatic lobe metastasis measures 40.9 mm.  GALLBLADDER/BILIARY TREE: The ga consult    Fever/leukocytosis  - ?etiology  - reviewed previous work up, had CT chest/A/P less than 2 weeks ago that were normal  - check CXR  - blood culture  - UA/Urine cx  - send stool for c.diff  - CT sinus - is already planned as outpt    Sigmoid and

## 2017-06-12 NOTE — PLAN OF CARE
NURSING ADMISSION NOTE      Patient admitted via admitted as direct admit   Oriented to room. Safety precautions initiated. Bed in low position. Call light in reach.   Updated history and pta meds gave report to [facility]

## 2017-06-13 PROBLEM — R50.9 FEVER: Status: ACTIVE | Noted: 2017-01-01

## 2017-06-13 NOTE — CONSULTS
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    Constitutional: see above  Eyes: Negative for eye drainage and redness.   Ears, nose, mouth, throat: Negative for nasal congestion, sore throat, oral ulcers  Respiratory: Negative for sputum production,wheezing, dyspnea  Cardiovascular: Negative for 107   CO2  23.4  24.0   ALKPHO  456*   --    AST  92*   --    ALT  104*   --    BILT  0.77   --    TP  7.1   --      Recent Labs   Lab  06/12/17   1651   COLORUR  Yellow   CLARITY  Clear   SPECGRAVITY  1.015   GLUUR  Negative   BILUR  Negative   KETUR  Neg Normal size cardiac silhouette. A small battery projects over the left heart border. MEDIASTINUM:  Normal. PLEURA:  Normal.  No pleural effusions. BONES:  Normal for age.      5/31/2017  CONCLUSION:  No acute disease.     Dictated by: Wilder Merlin, MD lateral base of the left maxillary sinus which could reflect small osteoma . FOVEA ETHMOIDALIS:  The fovea ethmoidalis is intact. The cribriform plate is not low lying. OTHER:  No lytic or blastic bony lesions are appreciated.       6/12/2017  CONCLUSION: hypoattenuating, metastatic lesions. One is a metastasis in the left lobe measures 33.8 mm. One of the largest right hepatic lobe metastasis measures 40.9 mm. GALLBLADDER/BILIARY TREE: The gallbladder is contracted.  There is no intrahepatic or extrahepatic heart minor seen projecting over the left cardiac border. Heart size is normal. Stable ectasia and tortuosity of the thoracic aorta noted. No new infiltrate, pulmonary edema or pleural effusion is seen. No pneumothorax.       6/12/2017  CONCLUSION:  No acu

## 2017-06-13 NOTE — DIETARY MALNUTRITION NOTE
NUTRITION INITIAL ASSESSMENT    Pt is at moderate nutrition risk. Pt meets malnutrition criteria.     NUTRITION DIAGNOSIS/PROBLEM:    Malnutrition related to physiological causes increasing nutrient needs as evidenced by pt with decreased appetite/po (consu from Last 6 Encounters:  06/12/17 : 90.629 kg (199 lb 12.8 oz)  06/12/17 : 91.853 kg (202 lb 8 oz)  06/07/17 : 93.804 kg (206 lb 12.8 oz)  05/31/17 : 99.338 kg (219 lb)  05/25/17 : 99.338 kg (219 lb)  05/18/17 : 97.841 kg (215 lb 11.2 oz)      NUTRITION:

## 2017-06-13 NOTE — PLAN OF CARE
Patient alert and oriented x4. Patient denies pain this morning. Patient complaining of nausea and given PRN zofran this morning. Patient with non-productive,dry cough. Patient with diarrhea this morning and stool sent for c-diff.  Patient will remain on co

## 2017-06-13 NOTE — PROGRESS NOTES
[facility] Cardiology Progress Note        Yas Padilla Patient Status:  Inpatient    7/3/1950 MRN [id number]   [facility] 4NW-A Attending Gonzales Johnson MD   Hosp Day # 1 PCP Lily Valdez MD     Subjective:  No 139   K  4.2   --   3.4*   CL  103   --   107   CO2  23.4   --   24.0   BUN  22*   --   17   CREATSERUM  1.13   --   0.86   CA   --    --   7.8*   MG   --   1.3*   --    GLU  151*   --   97       Recent Labs   Lab  06/12/17   1147   ALT  104*   AST  92*

## 2017-06-13 NOTE — CERTIFICATION
**Certification    PHYSICIAN Certification of Need for Inpatient Hospitalization    Based on the his current state of illness, Bernadette Solis requires inpatient hospitalization for his fever, CP, troponin elevation.   This requires inpatient medical treatment be

## 2017-06-13 NOTE — CONSULTS
[facility]  Report of Consultation    Neo Randall Padilla Patient Status:  Inpatient    7/3/1950 MRN [id number]   [facility] 4NW-A Attending Bao Clemente MD   Hosp Day # 0 PCP Thalia Prince MD     Reason for Consultation:  Chest pain + tr they asked the wife to drive him here for direct admission. En route, had 5 min of left chest pain. This was similar to the aches he has felt on the new chemo that was previously in the right chest, legs, back -the pain moves around.   After 5 min, the greg Location: [facility]    M-SEDAJ BY MATHEW OZUNA [facility] [place] [place] 5+ YR N/A 8/15/2014    Comment Procedure: LUMBAR EPIDURAL;  Surgeon: Ana Cristina Abdul MD;  Location: [address]    INJECTION, W/WO CONTRAST, DX/THERAPEUTIC SUBSTA that he has never smoked. He has never used smokeless tobacco. He reports that he drinks about 1.2 - 1.8 oz of alcohol per week. He reports that he uses illicit drugs (Cannabis).     Allergies:  No Known Allergies    Medications:    Current facility-adminis carotids  Cardiac: Regular rate and rhythm, S1, S2 normal, no murmur, rub or gallop. Lungs: Clear without wheezes, rales, rhonchi or dullness. Normal excursions and effort. Abdomen: Soft, non-tender, non-distended.   No hepatosplenomegaly, bruits, masses

## 2017-06-14 NOTE — PHYSICAL THERAPY NOTE
PHYSICAL THERAPY QUICK EVALUATION - INPATIENT    Room Number: 425/425-A  Evaluation Date: 6/14/2017  Presenting Problem: Diarrhea, fever, leukocytosis  Physician Order: PT Eval and Treat    Problem List  Active Problems:    Diarrhea    Fever      Past Medi EPIDURAL/SUBARACHNOID; LUMBAR/SACRAL N/A 9/8/2014    Comment Procedure: LUMBAR EPIDURAL;  Surgeon: Lennice Felty, MD;  Location: [address] [place] [address] NDL/CATH SPI DX/THER Sam Lorenzana 84 N/A 9/8/2014    Comment Procedure: Kiki Douglas Rates nausea level 2/10, otherwise no pain      RANGE OF MOTION AND STRENGTH ASSESSMENT  Upper extremity ROM and strength are within functional limits     Lower extremity ROM is within functional limits     Lower extremity strength is within functional zhang exercising/moving in order to maintain/improve strength. Modified Amador Balance Test    1.  Sitting to standing                                                                   4  2. Standing unsupported with eyes closed                                4

## 2017-06-14 NOTE — PROGRESS NOTES
[facility] Hospitalist Progress Note     True Dadds Ply Patient Status:  Inpatient    7/3/1950 MRN [id number]   [facility] 4NW-A Attending Aurelia Mcfadden MD   [facility] Day # 2 PCP Kirstie Lopez MD     CC: follow up    SUBJECTIVE:  Still weak but Infusing Medication:  • sodium chloride 125 mL/hr at 06/13/17 1808     PRN Medication:acetaminophen, ondansetron HCl, Metoclopramide HCl, TraZODone HCl, LORazepam        Assessment/Plan:     73year old male with PMH sig for metastatic head and neck cancer

## 2017-06-14 NOTE — PROGRESS NOTES
[address] [facility]  TEL: [phone]  FAX: [phone]    [facility] Valerie Patient Status:  Inpatient    7/3/1950 MRN [id number]   [facility] 4NW-A Attending Candice Negrete MD   [facility] Day # 2 PCP Thalia Prince, ABDOMEN COMPLETE (CPT=76700)  COMPARISON:  [facility] , CT ANGIOGRAPHY, CHEST (CPT=71275), 1/17/2017, 8:34. INDICATIONS:  abnl LFTS, fevers. Head and neck cancer with bladder metastases.   TECHNIQUE:  Real time gray-scale ultrasound was used to evaluate the ab recheck CT C/A/P              -check new Bcx if more fevers  Case and plan d/w pt and staff.     Betito Harrison

## 2017-06-14 NOTE — PROGRESS NOTES
[facility]  Progress Note    Nimco Padilla Patient Status:  Inpatient    7/3/1950 MRN [id number]   [facility] 4NW-A Attending Sasha Barrios MD   [address] Day # 2 PCP Dahlia Mcdaniel MD     Subjective:    Feeling weak still  No fever no Eye Nightly   • maalox/diphenhydramine/lidocaine viscous  5-10 mL Oral TID & HS         ASSESSMENT AND PLAN:     Patient Active Problem List:     BENIGN HYPERTENSION     MALIG WERNER BLADDER NOS ([address]     Esophageal reflux     Hyperlipidemia     Sensorineural

## 2017-06-14 NOTE — PLAN OF CARE
A&Ox4. Afebrile. Denies any pain/discomfort, denies feeling feverish. Intermittent nausea, PRN pain medications provide moderate relief. Low appetite- only meal was lunch, did not finish. US abdomen completed- showed known liver mets.  Hx head/neck CA wit

## 2017-06-14 NOTE — PROGRESS NOTES
[facility] Cardiology Progress Note        Felipe Padilla Patient Status:  Inpatient    7/3/1950 MRN [id number]   [facility] 4NW-A Attending Antony Lawson MD   [facility] Day # 2 PCP Marisel Snow MD     Subjective:  No 06/12/17   1147  06/12/17   1151  06/12/17   1849  06/13/17   0614  06/14/17   0625   NA  137   --    --   139   --    K  4.2   --    --   3.4*  3.5*   CL  103   --    --   107   --    CO2  23.4   --    --   24.0   --    BUN  22*   --    --   17   --    CR

## 2017-06-14 NOTE — PROGRESS NOTES
[facility] Hospitalist Progress Note                                                                   [address] Core[address] Padilla  7/3/1950    CC: Fevers    Interval History:  - Feels about the same today  - A 17   --    CREATSERUM  1.13  0.86   --    CA   --   7.8*   --    ALB  2.4*   --    --    NA  137  139   --    K  4.2  3.4*  3.5*   CL  103  107   --    CO2  23.4  24.0   --    ALKPHO  456*   --    --    AST  92*   --    --    ALT  104*   --    --    BILT

## 2017-06-14 NOTE — CDS QUERY
Potential for Impaired Nutritional Status  Joann Chua  Dear Dr. Dory Roman,   Clinical information suggests potential for impaired nutritional status.  For accurate ICD-10-CM code assignment to reflect severity of illness and risk of morta

## 2017-06-15 NOTE — PLAN OF CARE
GASTROINTESTINAL - ADULT    • Minimal or absence of nausea and vomiting Progressing        PAIN - ADULT    • Verbalizes/displays adequate comfort level or patient's stated pain goal Progressing        RISK FOR INFECTION - ADULT    • Absence of fever/infect

## 2017-06-15 NOTE — PROGRESS NOTES
[facility]  TEL: [phone]  FAX: [phone]    Froy Padilla Patient Status:  Inpatient    7/3/1950 MRN [id number]   [facility] 4NW-A Attending Damaris Ocasio MD   [facility] Day # 3 PCP Myranda Silva, 6/14/2017  PROCEDURE:  US ABDOMEN COMPLETE (CPT=76700)  COMPARISON:  [facility] , CT ANGIOGRAPHY, CHEST (CPT=71275), 1/17/2017, 8:34. INDICATIONS:  abnl LFTS, fevers. Head and neck cancer with bladder metastases.   TECHNIQUE:  Real time gray-scale ultrasou abx)              -recheck CT C/A/P since last CT was done over 3 weeks ago  Case and plan d/w pt and staff.     Gurvinder Zamarripa

## 2017-06-15 NOTE — PROGRESS NOTES
[facility] Cardiology Progress Note        Jean Padilla Patient Status:  Inpatient    7/3/1950 MRN [id number]   [facility] 4NW-A Attending Rut Junior MD   [facility] Day # 3 PCP Ratna Murray MD     Subjective:  No 06/14/17   1340  06/15/17   0521   WBC  18.22*  14.7*   --   14.2*   HGB  10.1*  8.7*  8.3*  8.1*   PLT  180  137.0*   --   132.0*   BANDSPCT  0   --    --    --    LYMPHOPCT  5   --    --    --    MONOPCT  8   --    --    --        Chem:  Recent Labs   L

## 2017-06-15 NOTE — PROGRESS NOTES
[facility] Hospitalist Progress Note                                                                   [address] Padilla  7/3/1950    CC: Fevers    Interval History:  - Still with persistent nausea  - 28.6   MCHC  33.0  33.2   --   33.8   RDW  15.7  15.8   --   15.8   NEPRELIM   --   11.61*   --   11.61*   WBC  18.22*  14.7*   --   14.2*   PLT  180  137.0*   --   132.0*     Recent Labs   Lab  06/12/17   1147  06/13/17   0614  06/14/17   0625  06/15/17 restarting supplements    Anemia  - Hgb slowly downtrending, is on IVF, no evidence of bleeding  - Monitor  - Chronic anemia felt to be 2/2 cancer/chemo  - Transfuse for hgb < 7    Elevated LFTs  - known liver mets, maybe also opdivo effect per Onc  - stab

## 2017-06-15 NOTE — CM/SW NOTE
06/15/17 1500   CM/SW Screening   Referral Source Social Work (self-referral)   [address] staff; Chart review   Patient's [address]   Number of Levels in Home 2   Patient Status Prior to Admission   Independent with ADLs and Mob

## 2017-06-15 NOTE — PROGRESS NOTES
[facility]  Progress Note    Seble Padilla Patient Status:  Inpatient    7/3/1950 MRN [id number]   [facility] 4NW-A Attending Christy Mcdaniel MD   [facility] Day # 3 PCP Michelle Lowry MD     Subjective:    Feeling weak   Low grade fever --    --   294*   AST  92*   --    --   75*   ALT  104*   --    --   65*   BILT  0.77   --    --   0.9   TP  7.1   --    --   5.8*           Imaging:    US liver  1. Multiple intrahepatic masses most consistent with metastases, given the history.     Medic supportive care  Monitor     3. Elevated LFTs  Part from liver metastases and part from Opdivo  Improved LFTs off Opdivo   Monitor     4.  Leukocytosis    Mainly neutrophilia  Could from opdivo induced autoimmune inflammation versus infection  Appreciate ID

## 2017-06-16 NOTE — PROGRESS NOTES
Feels well today. CT imaging negative for source of infection. Continues to have very low grade fevers. At this point appears fevers are inflammatory response to opdivo. He is walking the halls and eating better.     I think he can go home today and FU

## 2017-06-16 NOTE — PROGRESS NOTES
[facility] Cardiology Progress Note        Felipe Padilla Patient Status:  Inpatient    7/3/1950 MRN [id number]   [facility] 4NW-A Attending Antony Lawson MD   [facility] Day # 4 PCP Marisel Snow MD     Subjective:  No Cath:      Labs:  HEM:  Recent Labs   Lab  06/13/17   1148  06/14/17   1340  06/15/17   0521   WBC  14.7*   --   14.2*   HGB  8.7*  8.3*  8.1*   PLT  137.0*   --   132.0*       Chem:  Recent Labs   Lab  06/14/17   0625  06/15/17   0521  06/15/17   1730  06

## 2017-06-16 NOTE — DIETARY NOTE
NUTRITION FOLLOW UP ASSESSMENT    Pt is at moderate nutrition risk. Pt meets malnutrition criteria.     NUTRITION DIAGNOSIS/PROBLEM:    Malnutrition related to physiological causes increasing nutrient needs as evidenced by pt with decreased appetite/po (con shakes in. Discussed/encouraged protein rich/nutrient dense foods, supplements, small frequent meals and snacks. Per H&P: pt s/p chemotherapy w/ initial response, then noted recurrence, currently on biologic - Opdivo- started on 5/25/17.  Pt noted that h

## 2017-06-16 NOTE — PROGRESS NOTES
[facility]  Progress Note    Ciro Padilla Patient Status:  Inpatient    7/3/1950 MRN [id number]   [facility] 4NW-A Attending Maral Martinez MD   [facility] Day # 4 PCP Lord Shae MD     Subjective:    Feeling better today  No high g Pantoprazole Sodium  20 mg Oral QAM AC   • magnesium oxide  400 mg Oral TID   • Metoprolol Succinate ER  100 mg Oral Daily Beta Blocker   • Rivaroxaban  20 mg Oral Daily with food   • Sodium Chloride (Hypertonic)  1 drop Right Eye Nightly   • maalox/diphen

## 2017-06-16 NOTE — PROGRESS NOTES
[facility]  TEL: [phone]  FAX: [phone]    Kaylee Padilla Patient Status:  Inpatient    7/3/1950 MRN [id number]   [facility] 4NW-A Attending Ilir Finch MD   [facility] Day # 4 PCP Oksana Smith, 8:34. STEPHANIE , US ABDOMEN COMPLETE (CPT=76700), 6/14/2017, 7:46. INDICATIONS: fevers    TECHNIQUE: IV contrast-enhanced scanning through the chest, abdomen, and pelvis was performed. Dose reduction techniques were used.  Dose information is transmitted t a loculated fluid collection or free fluid in the abdomen or pelvis. 3. No evidence of consolidation in the lungs. 4. The prostate gland is enlarged.     Dictated by: Yolie Rowland MD on 6/15/2017 at 22:22   Approved by: Yolie Rowland MD

## 2017-06-16 NOTE — DISCHARGE SUMMARY
General Medicine Discharge Summary     Patient ID:  Raciel Padilla  73year old  7/3/1950    Admit date: 6/12/2017    Discharge date and time:  6/16/17    Attending Physician: Keshia Aguila xarelto    Weight loss  - due to cancer, poor po intake  - nutrition consult  - added supplments- eating better    Nausea  - Hard to control- though now eating more  - Continue zofran, compazine and prn ativan for nausea  - Stop IVFs     Hypercalcemia -- r tablets by mouth 2 (two) times daily. Sodium Chloride, Hypertonic, (CRISTIAN 128) 5 % Ophthalmic Ointment  Place 1 drop into the right eye nightly.     Lansoprazole (PREVACID) 15 MG Oral Capsule Delayed Release  1 CAPSULE DAILY BEFORE EATING    Calcium Cit

## 2017-06-16 NOTE — PROGRESS NOTES
NURSING DISCHARGE NOTE    Discharged home . Pt had one last emesis prior to leaving floor. Decided to leave inspite of one episode of emesis ten minutes after recieving his scheduled Zofran IV. Portacath removed after saline and heparin flushes. Revie

## 2017-06-18 PROBLEM — R53.1 WEAKNESS GENERALIZED: Status: ACTIVE | Noted: 2017-01-01

## 2017-06-19 PROBLEM — E86.0 DEHYDRATION: Status: ACTIVE | Noted: 2017-01-01

## 2017-06-19 PROBLEM — D64.9 ANEMIA, UNSPECIFIED TYPE: Status: ACTIVE | Noted: 2017-01-01

## 2017-06-19 NOTE — ED NOTES
Pt ambulated with slow steady gait to BR. Pt returns to bed without complication. No additional needs at this time.

## 2017-06-19 NOTE — CONSULTS
[facility]  Report of Consultation    Mckinley Padilla Patient Status:  Inpatient    7/3/1950 MRN [id number]   [facility] 4NW-A Attending Grant De La Fuente MD   [facility] Day # 1 PCP Ignacia Villanueva MD     REASON FOR CONSULTATION:     Fail N/A 8/15/2014    Comment Procedure: LUMBAR EPIDURAL;  Surgeon: Darinel Rojas MD;  Location: [address] [place] [address] BY [facility] [address] [facility] 59  N [facility] 5+ YR N/A 8/15/2014    Comment Procedure: LUMBAR EPIDURAL;  Surgeon: Darinel Rojas MD;  Yuliana Saxena Sister      Controlled   • Diabetes Mother       of complications      reports that he has never smoked. He has never used smokeless tobacco. He reports that he drinks about 1.2 - 1.8 oz of alcohol per week.  He reports that he uses illicit drugs Miles Britton tender with good bowel sounds. Extremities: Pedal pulses are present. No edema. Neurological: Grossly intact. Lymphatics:  There is no palpable lymphadenopathy         DIAGNOSTIC WORK UP:     Laboratory Data:      Lab Results  Component Value Date education/counseling     Diarrhea     Fever     Weakness generalized     Dehydration     Anemia, unspecified type      1. NH cancer, metastatic to liver   On Opdivo in clinic - now off treatment     2.  Failure to thrive - since the last Opdivo infusion   L

## 2017-06-19 NOTE — ED PROVIDER NOTES
Patient Seen in: [facility] Emergency Department    History   Patient presents with:  Fatigue (constitutional, neurologic)    Stated Complaint: general weakness    HPI    Patient is a 71-year-old male with extensive medical history as noted below in History    COLONOSCOPY  2008    Comment NL    COLONOSCOPY  2011    Comment DIVERTICULOSIS-=- NO POLYP    COLONOSCOPY  2005 AND 2003    Comment POLYPS    OTHER SURGICAL HISTORY  2002-4    Comment BLADDER CA    OTHER SURGICAL HISTORY      Comment DENTAL IMPL Location: [facility]    COLONOSCOPY  11/12/14 - PRAKASH Goyal    UPPER GI ENDOSCOPY - REFERRAL  11/12/14 - PRAKASH Goyal    Comment normal EGD    COLONOSCOPY N/A 11/12/2014    Comment Chavezet Milling. 2 adenomas, diverticuli, hemorrhoids, repeat 2019.     BACK directed, apply thin layer over mediport site, cover, 1 hour prior to treatment   Cholecalciferol (VITAMIN D) 1000 UNITS Oral Tab,  Take 2 tablets by mouth 2 (two) times daily.      Sodium Chloride, Hypertonic, (CRISTIAN 128) 5 % Ophthalmic Ointment,  Place 1 d clear. Mucous membranes are  somewhat dry. NECK: There is no focal tenderness to palpation appreciated. There is no JVD. No meningeal signs or nuchal rigidity appreciated. No stridor. LUNGS: Clear to auscultation bilaterally with no wheeze.  There is good DIFFERENTIAL - Abnormal; Notable for the following:     WBC 15.2 (*)     RBC 3.16 (*)     HGB 8.9 (*)     HCT 26.7 (*)     .0 (*)     RDW-SD 48.8 (*)     Neutrophil Absolute Prelim 12.41 (*)     Neutrophil Absolute 12.41 (*)     Monocyte Absolute 1. etiology of his elevated troponin is uncertain. Patient's INR is found to be 2.3.   Patient had blood cultures obtained here in the emergency room After discussion with Dr. Thelbert Apgar who wanted the cultures drawn although he believes that the patient's fever i

## 2017-06-19 NOTE — DIETARY MALNUTRITION NOTE
NUTRITION INITIAL ASSESSMENT    Pt is at moderate nutrition risk. Pt meets malnutrition criteria.     NUTRITION DIAGNOSIS/PROBLEM:    Malnutrition related to physiological causes increasing nutrient needs as evidenced by pt with 14 lbs wt loss in the past m Poor  Intake: <50%  Intake Meeting Needs: No  Food Allergies: No  Cultural/Ethnic/Worship Preferences Addresses: Yes    NUTRITION RELATED PHYSICAL FINDINGS:     1. Body Fat/Muscle Mass: BMI: 26.33     2.  Fluid Accumulation: none noted    NUTRITION PRESCR

## 2017-06-19 NOTE — PLAN OF CARE
GASTROINTESTINAL - ADULT    • Maintains adequate nutritional intake (undernourished) Not Progressing        Assumed care this morning  Patient was alert and oriented x 3   zofran was given for nausea. With effective result. Vitals are stable.    Appeared

## 2017-06-19 NOTE — CM/SW NOTE
06/19/17 1400   CM/SW Referral Data   Referral Source Social Work (self-referral)   Reason for Referral Discharge planning;Readmission   Informant Patient;Spouse   Readmission Assessment   Factors that patient feels contributed to this readmission (weak

## 2017-06-19 NOTE — H&P
[facility] Hospitalist History and Physical      Patient presents with:  Fatigue (constitutional, neurologic)       PCP: Asia Venegas MD      History of Present Illness: Patient is a 73year old male with PMH sig for bladder cancer, HTN, GERD, migraines and LUMBAR EPIDURAL;  Surgeon: All Shahid MD;  Location: [address] [place] [address]    M-SEDAJ BY [facility] [address] [facility] 59  N SVC 5+ YR N/A 8/15/2014    Comment Procedure: LUMBAR EPIDURAL;  Surgeon: All Shahid MD;  Location: [address] [place] [address] History   Problem Relation Age of Onset   • Cancer Mother      Colon Cancer   • Cancer Sister      Breast Cancer   • Cancer Maternal Grandmother      Breast Cancer   • Hypertension Sister      Controlled   • Hypertension Sister      Controlled   • Diabetes LABS:     Lab Results  Component Value Date   WBC 14.6 06/19/2017   HGB 8.1 06/19/2017   HCT 24.5 06/19/2017   .0 06/19/2017   CREATSERUM 0.85 06/19/2017   BUN 16 06/19/2017    06/19/2017   K 3.6 06/19/2017    06/19/2017   CO2 24.0 06/ Radiology Data Registry) which includes the Dose Index Registry. PATIENT STATED HISTORY: (As transcribed by Technologist)  Patient c/o fever, congestion and patient is extremely tired. FINDINGS:  NASAL SEPTUM:  There is leftward nasal septal deviation. Real time gray-scale ultrasound was used to evaluate the abdomen. The exam includes images of the liver, gallbladder, common bile duct, pancreas, spleen, kidneys, IVC, and aorta. FINDINGS:  LIVER:  Increased in echogenicity.  Multiple hypoechoic masses th ANGIOGRAPHY, CHEST (CPT=71275), 1/17/2017, 8:34. EDWARD , US ABDOMEN COMPLETE (CPT=76700), 6/14/2017, 7:46. INDICATIONS:  fevers  TECHNIQUE:  IV contrast-enhanced scanning through the chest, abdomen, and pelvis was performed.   Dose reduction techniques w deposits are noted. 2. No evidence of a loculated fluid collection or free fluid in the abdomen or pelvis. 3. No evidence of consolidation in the lungs. 4. The prostate gland is enlarged.     Dictated by: Royal Bello MD on 6/15/2017 at 22:22     A extrahepatic biliary ductal dilatation. PANCREAS: The pancreas is grossly normal in size and homogeneous. There is no pancreatic ductal dilatation. SPLEEN: The spleen is grossly normal in size and homogeneous without focal solid or cystic lesions.  ADRENAL AP chest radiograph was obtained. COMPARISON:  EDWARD , XR CHEST PA + LAT CHEST (CPT=71020), 5/31/2017, 6:36. INDICATIONS:  cough  PATIENT STATED HISTORY: (As transcribed by Technologist)  Fever and weakness. Chemo patient.     FINDINGS:  There is a left s filling defects are seen centrally or peripherally within the pulmonary arterial system. No sign of effusion, pneumothorax, adenopathy, thoracic aortic aneurysm, thyroid gland enlargement, airway lesion. Some coronary calcification seen.   Numerous hepatic ct    #HTN- cont bp meds    #GERD- cont ppi    #Elevated troponin  -recent cardiac workup negative  -no active chest pain    #Fevers/leukocytosis  -infection workup pending    #Sigmoid and transverse sinus thrombosis  -cont xarelto    #HN cancer with liver

## 2017-06-20 NOTE — PROGRESS NOTES
[facility]  Progress Note    Naren Padilla Patient Status:  Inpatient    7/3/1950 MRN [id number]   [facility] 4NW-A Attending Melvin Marroquin, [address] Day # 2 PCP Tila Moore MD     Subjective:    Not well  Feeling sad and has n Hypokalemia     SVT (supraventricular tachycardia) (HCC)     Acute chest pain     Cerebral venous sinus thrombosis     Cerebral venous sinus thrombosis     Thrombosis     Hypomagnesemia     Elevated liver enzymes     Acute conjunctivitis of right eye     H

## 2017-06-20 NOTE — CDS QUERY
Potential for Impaired Nutritional Status  DOCUMENTATION CLARIFICATION FORM  Dear Doctor:  Clinical information suggests potential for impaired nutritional status.  For accurate ICD-10-CM code assignment to reflect severity of illness and risk of mortality,

## 2017-06-20 NOTE — CONSULTS
[facility] Urology   Consultation Note    Sivakumar Romero Patient Status:  Inpatient    7/3/1950 MRN [id number]   [facility] 4NW-A Attending Paris Ghosh, DO   Hosp Day # 2 PCP Bao Lewis MD     Reason for Consultation:  Bobo Gist 2002-4    Comment BLADDER CA    OTHER SURGICAL HISTORY      Comment DENTAL IMPLANT    OTHER SURGICAL HISTORY  2011 X 2    Comment BLADDER CA RECURRENCE    INJECTION, W/WO CONTRAST, DX/THERAPEUTIC SUBSTANCE, EPIDURAL/SUBARACHNOID; LUMBAR/SACRAL N/A 8/15/201 Comment Domonique Recinos. 2 adenomas, diverticuli, hemorrhoids, repeat 2019.     BACK SURGERY  2/2/15    Comment L4-L5 PPS TLIF     OTHER  3/14/2014    Comment [employer] [place] [employer] LO[employer]      Family History   Problem Relation Age of Onset   • Cancer Mother SpO2 98%  GENERAL: Patient sitting up in chair eating. LUNGS: non-labored respirations  ABDOMEN: The abdomen is soft and nontender without rebound or guarding. BACK: Without CVA tenderness. SKIN: Without stigmata. NEUROLOGIC: Grossly intact.     L chronic calcifications. Limited    assessment of the urinary bladder which contains only a small amount of urine at the time of scanning.        Diverticula present sigmoid colon, but no evidence for acute diverticulitis. No sign of colitis.  No bowel obstru Recommend an in office cystourethroscopy with Dr. Ritika Swift. Repeat serum PSA as an outpatient if not done recently. Will sign off; please call office if any further questions/concerns arise during this admission.       Thank you for allowing me to parti

## 2017-06-20 NOTE — PHYSICAL THERAPY NOTE
PHYSICAL THERAPY EVALUATION - INPATIENT     Room Number: 407/407-A  Evaluation Date: 6/20/2017  Type of Evaluation: Initial  Physician Order: PT Eval and Treat    Presenting Problem: dehydration, weakness, anemia  Reason for Therapy: Mobility Dysfuncti Marga Jackson MD;  Location: [address] [place] [address] NDL/CATH SPI DX/THER NJX N/A 8/15/2014    Comment Procedure: LUMBAR EPIDURAL;  Surgeon: Lauren Danielle MD;  [place] [facility]    M-SEDAJ BY [facility] [address] [facility] 59  N Yes    Lives With: Spouse  Drives: Yes  Patient Owned Equipment: Rolling walker  Patient Regularly Uses: Reading glasses    Prior Level of [place]: Independent ambulator sans AD, drives; likes to hike and travel    SUBJECTIVE  \"This cough is driving AM-PAC Score:  Raw Score: 20   PT Approx Degree of Impairment Score: 35.83%   Standardized Score (AM-PAC Scale): 47.67   CMS Modifier (G-Code): CJ    FUNCTIONAL ABILITY STATUS  Gait Assessment   Gait Assistance: Maximum assistance (actual min assist) PT    PLAN  PT Treatment Plan: Bed mobility; Endurance; Energy conservation;Gait training;Strengthening;Stoop training;Stair training;Transfer training;Balance training  Rehab Potential : Fair  Frequency (Obs): 5x/week  Number of Visits to Meet Established G

## 2017-06-20 NOTE — CM/SW NOTE
Pt resting. SW met w/pt's wife. Pt's wife agreeable to HHPT. Pt's wife did not have an agency preference. Completed HH order.  Paged [facility] w/referral.

## 2017-06-20 NOTE — PROGRESS NOTES
[facility] Hospitalist Progress Note                                                                   [address] [facility]  7/3/1950    SUBJECTIVE: pt states he feels even worse today.  Not eating at all be guaiFENesin-codeine    Assessment/Plan:  Principal Problem:    Weakness generalized  Active Problems:    Fever    Dehydration    Anemia, unspecified type    Patient is a 73year old male with PMH sig for bladder cancer, HTN, GERD, migraines and HN cancer w

## 2017-06-20 NOTE — OCCUPATIONAL THERAPY NOTE
OCCUPATIONAL THERAPY EVALUATION - INPATIENT     Room Number: 407/407-A  Evaluation Date: 6/20/2017  Type of Evaluation: Initial  Presenting Problem: dehydration, weakness, anemia    Physician Order: IP Consult to Occupational Therapy  Reason for Therapy: A DENTAL IMPLANT    OTHER SURGICAL HISTORY  2011 X 2    Comment BLADDER CA RECURRENCE    INJECTION, W/WO CONTRAST, DX/THERAPEUTIC SUBSTANCE, EPIDURAL/SUBARACHNOID; LUMBAR/SACRAL N/A 8/15/2014    Comment Procedure: LUMBAR EPIDURAL;  Surgeon: GILLES Scott 2019.     BACK SURGERY  2/2/15    Comment L4-L5 PPS TLIF     OTHER  3/14/2014    Comment [employer] LOOP RECORDER        HOME SITUATION  Type of Home: House  Home Layout: Two level  Lives With: Spouse (spouse is retired and can assist PRN)    Toilet and Short Form  How much help from another person does the patient currently need…  -   Putting on and taking off regular lower body clothing?: A Little  -   Bathing (including washing, rinsing, drying)?: A Little  -   Toileting, which includes using toilet, b cancer, recent onset of multiple deficits, deconditioning. The AM-SNEHA ' ‘6-Clicks’ Inpatient Daily Activity Short Form was completed and  this patient  is demonstrating a  38.32% degree impairment in activities of daily living.  Research supports that pat

## 2017-06-21 NOTE — CONSULTS
Pulmonary / Critical Care H&P/Consult       NAME: Jason Vasquez - ROOM: [address] [place] [address] - MRN: [id number] - Age: 73year old - :  7/3/1950    Date of Admission: 2017  7:43 PM  Admission Diagnosis: Dehydration [E86.0]  Weakness generalized [R53.1]  Anemia, u SUBSTANCE, EPIDURAL/SUBARACHNOID; LUMBAR/SACRAL N/A 8/15/2014    Comment Procedure: LUMBAR EPIDURAL;  Surgeon: Mell Sarmiento MD;  Location: [place] [address] [address] NDL/CATH SPI DX/THER [address] N/A 8/15/2014    Comment Procedure: Allergies:  No Known Allergies    Social History:    Social History   Marital Status:   Spouse Name: N/A    Years of Education: N/A  Number of Children: N/A     Occupational History  None on file     Social History Main Topics   Smoking status: (0.1 mg total) by mouth 2 (two) times daily. (Patient taking differently: Take 0.1 mg by mouth 2 (two) times daily.  Takes a third dose if blood pressure is high ) Disp: 180 tablet Rfl: 3   Benadryl/Maalox/Lidocaine 1:1:1 solution Take 5-10 mL by mouth TID 06/20/17  1300 06/20/17 2029 06/21/17 0520 06/21/17  1152   BP: 151/76 157/73 148/81 145/78   Pulse: 82 88 92 89   Temp:  99.3 °F (37.4 °C) 98.8 °F (37.1 °C) 97.8 °F (36.6 °C)   TempSrc:  Oral Oral Oral   Resp:  18 18 18   Height:       Weight:       SpO CL  104  105   --    CO2  21.0*  24.0   --    BUN  19  16   --        CREATININE, SERUM (mg/dL)   Date Value   10/15/2015 1.09   01/20/2015 1.12   06/13/2014 0.99   ----------  CREATININE (mg/dL)   Date Value   06/19/2017 0.85   06/18/2017 1.00   06/15/2

## 2017-06-21 NOTE — HOME CARE LIAISON
Referral received for [facility]. Met with pt to discuss home health services and coverage criteria. Pt agreeable to [facility]. Agency brochure provided. Referral placed via [facility].  [facility] has accepted pt and will provide skilled nurse and physical thera

## 2017-06-21 NOTE — BH LEVEL OF CARE ASSESSMENT
Level of Care Assessment Note                             General Questions  Why are you here?: Patient came into the hospist due to feeling weak and tired. Precipitating Events: Pt has cancer and has had poor appetite.   History of Present Illness: 72 y/o observed.   Bipolar Symptoms: No problems reported or observed  Sleep Pattern: Sleeps all night  Number of Sleep Hours: 8 Hours  Use of Sleep Aids: deneis  Appetite Symptoms: Decreased  Unplanned Weight Loss: Yes (comment) (pt lost 10-20 lbs in the past 5 w No  Possible Abuse Reportable to[de-identified] Not appropriate for reporting to authorities         Assessment Summary  Assessment Summary: 74 y/o with cancer with mets to liver per record. He admits to feeling depressed due to his medical circunstances.   He denies a

## 2017-06-21 NOTE — PROGRESS NOTES
[facility]  Progress Note    Chelsey Padilla Patient Status:  Inpatient    7/3/1950 MRN [id number]   [facility] 4NW-A Attending London Brewer, [address] [facility] Day # 3 PCP Kirstie Lopez MD     Subjective:    No change noted  Feeling sad Hypomagnesemia     Elevated liver enzymes     Acute conjunctivitis of right eye     Health education/counseling     Diarrhea     Fever     Weakness generalized     Dehydration     Anemia, unspecified type      1.  NH cancer, metastatic to liver   On Opdivo

## 2017-06-21 NOTE — PHYSICAL THERAPY NOTE
PHYSICAL THERAPY TREATMENT NOTE - INPATIENT    Room Number: 407/407-A     Session: 1  Number of Visits to Meet Established Goals: 5    Presenting Problem: dehydration, weakness, anemia  History related to current admission: Pt with head/neck CA and  carlos 8/15/2014    Comment Procedure: LUMBAR EPIDURAL;  Surgeon: Darinel Rojas MD;  Location: [address] [place] [address] BY [facility] [address] [address] [address]  N [facility] 5+ YR N/A 8/15/2014    Comment Procedure: LUMBAR EPIDURAL;  Surgeon: Darinel Rojas MD;  Location:  BALANCE                                                                                                                     Static Sitting: Good  Dynamic Sitting: Fair +           Static Standing: Fair  Dynamic Standing: Fair -    ACTIVITY TOLERANCE  R reach;RN aware of session/findings; All patient questions and concerns addressed; Family present    ASSESSMENT   Pt with improved gait endurance. Continue to recommend use of r/w for stability. Pt fatigued following gait and ex.  Continue to recommend home wi

## 2017-06-21 NOTE — PLAN OF CARE
GASTROINTESTINAL - ADULT    • Maintains adequate nutritional intake (undernourished) Not Progressing          GASTROINTESTINAL - ADULT    • Minimal or absence of nausea and vomiting Progressing        Impaired Activities of Daily Living    • Achieve highes

## 2017-06-21 NOTE — PROGRESS NOTES
[facility] Hospitalist Progress Note                                                                   [address] [facility]  7/3/1950    SUBJECTIVE: pt is feeling hopeless, tearful at times.  States he is ok Carbonate-Vitamin D  2 tablet Oral Daily     Continuous Infusions:    PRN: LORazepam, Ondansetron HCl, benzonatate, guaiFENesin-codeine    Assessment/Plan:  Principal Problem:    Weakness generalized  Active Problems:    Fever    Dehydration    Anemia, uns that there is no drug to \"fix\" this. Family discussed with Dr. Lawrence Valenzuela today.       Sean Bañuelos  [facility] Hospitalist  [phone]

## 2017-06-22 PROBLEM — F32.89 ATYPICAL DEPRESSIVE DISORDER: Status: ACTIVE | Noted: 2017-01-01

## 2017-06-22 PROBLEM — F41.1 ANXIETY STATE: Status: ACTIVE | Noted: 2017-01-01

## 2017-06-22 NOTE — PLAN OF CARE
Patient c/o mid -moderate pain on chest with breathing and pain on left shoulder exacerbated by movement. Respirations non-labored, lungs sound diminished, O2sat 97%. Dr. De Paz aware regarding pain on chest and left shoulder.  Left arm with non-pitting

## 2017-06-22 NOTE — PROGRESS NOTES
92 [facility] Cardiology Progress Note        Alicia Gresham Patient Status:  Inpatient    7/3/1950 MRN [id number]   [facility] 4NW-A Attending Migel Lozano, [address] [facility] Day # 4 PCP Harman Frederick MD     Subjective:  Po Extremities: No edema  Neurologic: no focal deficits  Skin: Warm and dry.      Telemetry: none    EKG:      Echo:      Cardiac Cath:      Labs:  HEM:  Recent Labs   Lab  06/21/17   1200   WBC  13.7*   HGB  8.3*   PLT  88.0*       Chem:  Recent Labs   Lab

## 2017-06-22 NOTE — PHYSICAL THERAPY NOTE
PHYSICAL THERAPY TREATMENT NOTE - INPATIENT    Room Number: 407/407-A     Session: 2  Number of Visits to Meet Established Goals: 5    Presenting Problem: dehydration, weakness, anemia  History related to current admission: Pt with head/neck CA and  carlos 8/15/2014    Comment Procedure: LUMBAR EPIDURAL;  Surgeon: Barbara Lau MD;  Location: [address] [place] [address] BY [facility] [address] .[facility] 59  N [facility] 5+ YR N/A 8/15/2014    Comment Procedure: LUMBAR EPIDURAL;  Surgeon: Barbara Lau MD;  Location:  LUE  Management Techniques: Relaxation;Repositioning;Breathing techniques    BALANCE                                                                                                                     Static Sitting: Fair  Dynamic Sitting: TAMANNA Salcedo to asc/desc full flight of stairs to sleep and use BR. Pt unable to participate in assessment of stair negotiation 2/2 fatigue and decreased activity tolerance.      THERAPEUTIC EXERCISES  Lower Extremity Ankle pumps  LAQ     Upper Extremity  - open/truman independent      Goal #2  Patient is able to demonstrate transfers Sit to/from Stand at assistance level: modified independent      Goal #3  Patient is able to ambulate 300 feet with assist device: walker - rolling at assistance level: supervision      [facility]

## 2017-06-22 NOTE — PLAN OF CARE
C/o mild chest tightness. Stable vital signs. Kept monitored closely. Paged Dr Hans Melendez to inform. Awaiting reply.

## 2017-06-22 NOTE — OCCUPATIONAL THERAPY NOTE
OCCUPATIONAL THERAPY TREATMENT NOTE - INPATIENT     Room Number: 407/407-A  Session: 1/5  Number of Visits to Meet Established Goals: 5    Presenting Problem: dehydration, weakness, anemia    :   History related to current admission:   Admitted to ED 6/18 CONTRAST, DX/THERAPEUTIC SUBSTANCE, EPIDURAL/SUBARACHNOID; LUMBAR/SACRAL N/A 8/15/2014    Comment Procedure: LUMBAR EPIDURAL;  Surgeon: Chasity Andrew MD;  Location: [place] [address] [address] NDL/CATH SPI DX/THER Sam [address]om[address] N/A 8/15/20 RECORDER        SUBJECTIVE  'Its therapy, am I leatha. \"    Patient self-stated goal is to have less pain    OBJECTIVE  Precautions: None    WEIGHT BEARING RESTRICTION  Weight Bearing Restriction: None                PAIN ASSESSMENT  Rating: 10  Location: L instead of home at [place] along with education to spouse, patient on expected course and goals while in [facility]. Spouse in agreement, pt did not express any opinion. Pain subsided after pt seated in chair, LUE elevated on pillow. Nurse and CM updated.   Patient En toilet:  with supervision and with good judgement/safety--progressing    UE Exercise Program Goal  Patient will be modified independent with bilateral AROM HEP (home exercise program). -progressing    Additional Goals:  Patient will demosntrate knowledge of

## 2017-06-22 NOTE — CONSULTS
[facility]  Psychiatric Evaluation    Erna Padilla YOB: 1950   Age/Gender 73year old male MRN [id number]   [facility] 4NW-A Attending Merced Moon, [address] [facility] Day # 4 PCP Odin Ramirez MD     Date of Service:  6 things. He may have had a paradoxical reaction to Ativan as he was even more restless and anxious, as well as confused. Patient denies any previous psychiatric history until recently. He denies previous episodes of depression.   He denies any history he uses illicit drugs (Cannabis). Personal history:  Patient and wife are  for 45 years. He worked as a . They have no kids, but had many dogs which they counted as their kids. Support Systems: Spouse/significant other; Dennis Cross puff 1 puff Inhalation Daily   furosemide (LASIX) tab 20 mg 20 mg Oral Daily   CloNIDine HCl (CATAPRES) tab 0.1 mg 0.1 mg Oral BID   Vitamin B-12 (VITAMIN B12) tab 500 mcg 500 mcg Oral Daily   magnesium oxide (MAG-OX) tab 400 mg 400 mg Oral TID   Metoprolo slide  Standing Status: Standing  Number of Occurrences: 1  Troponin I  Standing Status: Standing  Number of Occurrences: 1  Potassium  Standing Status: Standing  Number of Occurrences: 1  Potassium  Standing Status: Standing  Number of Occurrences: 1  Pot

## 2017-06-22 NOTE — DIETARY NOTE
NUTRITION FOLLOW UP ASSESSMENT    Pt is at moderate nutrition risk. Pt meets malnutrition criteria.     NUTRITION DIAGNOSIS/PROBLEM:    Malnutrition related to physiological causes increasing nutrient needs as evidenced by pt with 14 lbs wt loss in the past poor appetite/po due to nausea. Pt with severe protein calorie malnutrition. Pt does not like Ensure, RD ordered magic cup for pt. Pt drinking protein supplement at home, encouraged pt to have family members bring shakes in.  Discussed/encouraged protein ri

## 2017-06-22 NOTE — CM/SW NOTE
PT now recommending [facility]. SW met w/pt and pt's family and informed them the PT is now recommending [facility]. Pt's wife agreeable to this. Provided pt's wife w/a list of facilities. Pt's wife to look over the list and decide on a facility tomorrow.  SW to follow u

## 2017-06-22 NOTE — PROGRESS NOTES
[facility] Hospitalist Progress Note                                                                   [address] [facility] J Ply  7/3/1950    SUBJECTIVE: pt is unchanged, wife states he had a very rough night.  C (Hypertonic)  1 drop Right Eye Nightly   • Calcium Carbonate-Vitamin D  2 tablet Oral Daily     Continuous Infusions:    PRN: gabapentin, diphenhydrAMINE, HYDROcodone-acetaminophen, acetaminophen, Ondansetron HCl, benzonatate, guaiFENesin-codeine    Assess

## 2017-06-23 NOTE — PROGRESS NOTES
[facility] Hospitalist Progress Note                                                                   [address] [facility] J Ply  7/3/1950    SUBJECTIVE: pt slept well last night.  He was able to walk a bit furth gabapentin, diphenhydrAMINE, HYDROcodone-acetaminophen, acetaminophen, Ondansetron HCl, benzonatate, guaiFENesin-codeine    Assessment/Plan:  Principal Problem:    Weakness generalized  Active Problems:    Fever    Dehydration    Anemia, unspecified type

## 2017-06-23 NOTE — PROGRESS NOTES
92 [facility] Cardiology Progress Note        Ariel Balderrama Patient Status:  Inpatient    7/3/1950 MRN [id number]   [facility] 4NW-A Attending Rose Abrams, [address] [facility] [address] Day # 5 PCP Luh Hansen MD     Subjective:   In Chem:  Recent Labs   Lab  06/21/17   1200  06/22/17   0539  06/23/17   0344   NA  136   --   137   K  3.8  3.7  3.9  3.9   CL  104   --   106   CO2  27.0   --   24.0   BUN  14   --   19   CREATSERUM  0.86   --   0.92   CA  7.6*   --   7.7*   MG   --

## 2017-06-23 NOTE — PROGRESS NOTES
[facility]  Progress Note    Xiang Padilla Patient Status:  Inpatient    7/3/1950 MRN [id number]   [facility] 4NW-A Attending William Mcdonald, [address] [facility] Day # 5 PCP Guille Pettit MD     Subjective:    No new changes      Objective: nausea     Hypotension     Hypotension, unspecified hypotension type     Hypokalemia     SVT (supraventricular tachycardia) (HCC)     Acute chest pain     Cerebral venous sinus thrombosis     Cerebral venous sinus thrombosis     Thrombosis     Hypomagnesem

## 2017-06-23 NOTE — PLAN OF CARE
Minimal or absence of nausea and vomiting Progressing    Patient denies nausea and vomiting. Maintains or returns to baseline bowel function Progressing    No BM yet today, continent of bowels.   Maintains adequate nutritional intake (undernourished) Progr

## 2017-06-23 NOTE — BH PROGRESS NOTE
Went to see the pt and gave him and his wife referrals to f/u with a therapist and psychiatrist.  The pt didn't stay awake but the wife was given the information. The patients nurse is aware of the plan.

## 2017-06-23 NOTE — CM/SW NOTE
[facility] and [facility] are both able to accept and can both provide a private room for the pt. Wife touring facilities now. Wife asking for a fridge at the [facility]. Informed her she would need to follow up w/the facilities regarding a fridge.

## 2017-06-23 NOTE — CM/SW NOTE
SW spoke w/pt's wife who stated she would like SW to send referrals to [facility] and [facility] ([facility] AND [facility]). SW send referrals via [address]. SW to follow up w/pt and pt's wife later today regarding facility choice.

## 2017-06-24 NOTE — PLAN OF CARE
Minimal or absence of nausea and vomiting Progressing    Denies nausea and vomiting, appetite poor. Maintains or returns to baseline bowel function Progressing    Regular bowel movements, denies diarrhea.   Maintains adequate nutritional intake (undernouri

## 2017-06-24 NOTE — PROGRESS NOTES
92 [facility] Cardiology Progress Note        Nava Hernandezflynn Patient Status:  Inpatient    7/3/1950 MRN [id number]   [facility] 4NW-A Attending Melvin Marroquin, [address] [facility] Day # 5 PCP Tila Moore MD     Subjective:  Suha Felix Chem:  Recent Labs   Lab  06/21/17   1200  06/22/17   0539  06/23/17   0344   NA  136   --   137   K  3.8  3.7  3.9  3.9   CL  104   --   106   CO2  27.0   --   24.0   BUN  14   --   19   CREATSERUM  0.86   --   0.92   CA  7.6*   --   7.7*   MG   --

## 2017-06-24 NOTE — DISCHARGE SUMMARY
[facility] Internal Medicine Discharge Summary   Patient ID:  Chris Burger  [id number]  65 year old  7/3/1950    Admit date: 6/18/2017    Discharge date and time: 6/24/2017     Attending Physician: Yaa Monzon MD     Primary Care Physician: Jennifer Garcia bladder cancer, HTN, GERD, migraines and HN cancer with mets to liver who presents to ER with generalized weakness and failure to thrive.     #Generalized Weakness  -likely 2/2 opdivo per oncology  -tsh WNL  -s/p IVF, encourage po  -pt admits feeling depre regular rate and rhythm, no murmur  Abd: Abdomen soft, +tender diffusely, nondistended, bowel sounds present  MSK: +Le edema mild, clubbing or cyanosis  Skin: no rashes or lesions       Discharge meds     Medication List      START taking these medications Maleate 10 mg tablet  Commonly known as:  COMPAZINE  Take 1 tablet (10 mg total) by mouth every 6 (six) hours as needed for Nausea.  Every 6 hours prn nausea or as instructed by physician     Rivaroxaban 20 MG Tabs  Commonly known as:  XARELTO  Take 1 table projects over the left heart border. MEDIASTINUM:  Normal. PLEURA:  Normal.  No pleural effusions. BONES:  Normal for age. CONCLUSION:  No acute disease.     Dictated by: Lex Nelson MD on 5/31/2017 at 6:59     Approved by: Lex Nelson MD could reflect small osteoma . FOVEA ETHMOIDALIS:  The fovea ethmoidalis is intact. The cribriform plate is not low lying. OTHER:  No lytic or blastic bony lesions are appreciated. CONCLUSION:  1. Mild chronic sinusitis changes.  No evidence for acute s venous system. B-mode two-dimensional images of the vascular structures, Doppler spectral analysis, and color flow. Doppler imaging were performed.   The following veins were imaged:  Subclavian, Jugular, Axillary, Brachial, Basilic, Cephalic, and the cont abdomen, and pelvis was performed. Dose reduction techniques were used. Dose information is transmitted to the [facility] [employer] of Radiology) NRDR ([address]) which  includes the Dose Index Registry.   PATIENT STATED HISTORY:(As by: Laurence Linares MD on 6/15/2017 at 22:22     Approved by: Laurence Linares MD            Xr Chest Ap Portable  (cpt=71010)    Result Date: 6/18/2017  PROCEDURE:  XR CHEST AP PORTABLE (CPT=71010)  TECHNIQUE:  AP chest radiograph was obtained.   COMP weakness, PAIN, ELEVATED TROP, MET BLADDER CANCER  TECHNIQUE:  CT of the chest (with CTA imaging), abdomen, and pelvis were obtained post- injection of non-ionic intravenous contrast material. Multi-planar reformatted/3-D images were created to optimize vi hardware in the spine. No bone lesions are identified. CONCLUSION:  Little change compare with previous CT scan from 3 days ago. Widespread hepatic metastases redemonstrated. No pulmonary embolism present. Other details above.     Dictated by: Izabella Allan

## 2017-06-24 NOTE — CM/SW NOTE
06/24/17 1000   Discharge disposition   Discharged to: Skilled Nurs   Name of Facillity/Home Care/Hospice [facility]   Discharge transportation [facility]     Pt ready for d/c. Wife confirmed choice of Christine Hale of [address]  [employer]

## 2024-04-26 NOTE — CONSULTS
[facility]  Report of Consultation    Aundra Holter Ply Patient Status:  Inpatient    7/3/1950 MRN [id number]   [facility] 4NW-A Attending Diane Koroma MD   Hosp Day # 1 PCP Luh Hansen MD     REASON FOR CONSULTATION:     DEBBIE acevedo Comment Procedure: LUMBAR EPIDURAL;  Surgeon: Lennice Felty, MD;  Location: [address] [place] [address] BY [facility] [address] .[facility] 59  N [facility] 5+ YR N/A 8/15/2014    Comment Procedure: LUMBAR EPIDURAL;  Surgeon: Lennice Felty, MD;  Location: [address] [place] [address]  Controlled   • Diabetes Mother       of complications      reports that he has never smoked. He has never used smokeless tobacco. He reports that he drinks about 1.2 - 1.8 oz of alcohol per week. He reports that he uses illicit drugs (Cannabis).     All 98%   HEENT: EOMs intact. PERRL. Oropharynx is clear   Neck: No JVD. No palpable lymphadenopathy. Neck is supple. Chest: Clear to auscultation. Heart: Regular rate and rhythm. Abdomen: Soft, non tender with good bowel sounds.    Extremities: Pedal pu of right eye     Health education/counseling     Diarrhea      1. NH cancer, metastatic to liver   On Opdivo in clinic    2. Failure to thrive   Likely related to Opdivo infusions and recent sinusitis   Need IV hydration and supportive care    3.  Elevated 6

## 2024-11-26 NOTE — PLAN OF CARE
Called Abran - no answer; LVM letting him know the results of his xray and that I placed a referral to pediatric orthopedics    Yanni Medrano M.D.   General Pediatrics  [facility]     GASTROINTESTINAL - ADULT    • Minimal or absence of nausea and vomiting Progressing        Impaired Functional Mobility    • Achieve highest/safest level of mobility/gait Progressing        PAIN - ADULT    • Verbalizes/displays adequate comfort level or pa

## (undated) NOTE — ED AVS SNAPSHOT
[facility] Emergency Department    [place]  [address] [place] [address]    Phone:  [phone]    Fax:  [phone]           Nolan Urbano   MRN: [id number]    Department:  [facility] Emergency Department   Date of Visit:  5/31/ [facility], [facility] ([facility])    [facility] [address] [facility]  [address]            Aug 23, 2017 11:40 AM   FOLLOW UP with León Lo MD   SP CARDIOLOGY [address] [facility]  Click [email or web address]      Or call [phone]    If you have any problems with your follow-up, please call our  at [phone]    Si usted tiene algun problema con lema sequimiento, por favor llame a nuestro adminstrador de casos al (24 24-Hour Pharmacies        Pharmacy Address Phone Number   Ap [address] ([address] [facility]) Nickie Emily [address].  (900 [facility] Narrative:    PROCEDURE:  XR CHEST PA + LAT CHEST (CPT=71020)     INDICATIONS:  FEVER     COMPARISON:  None. TECHNIQUE:  PA and lateral chest radiographs were obtained.      PATIENT STATED HISTORY: (As transcribed by Technologist)  He restarted c

## (undated) NOTE — IP AVS SNAPSHOT
[facility] [place] [address] [place], [address] ~ [phone]                Discharge Summary   6[facility]12/2017    [facility]           Admission Information        Provider Department    6/12/2017 Pete Arevalo MD  4nw-A - reasons to take this  - additional instructions        Take 1 tablet (0.5 mg total) by mouth every 6 (six) hours as needed (nausea).     Stephen Ahumada taking these medications        Instructions Authorizing Zoraida Wood                           TOPROL  MG Tb24   Last time this was given:  100 mg on 6/16/2017  5:54 AM   Generic drug:  Metoprolol Succinate ER        Take 100 mg by mouth daily.  Take 1 tablet in AM    Yoel Dwyer [address] [facility] [address] [phone]              Immunization History as of 6/16/2017  Never Reviewed    INFLUENZA 10/1/2016, 10/15/2015, 9/17/2014, 10/1/2013, 9/28/2012    Pneumococcal (Prevnar 13) 10/1/2016    Zoster (Shingles) 5/11/2012      Recent ALT Bilirubin,Total Total Protein Albumin Sodium Potassium Chloride    (06/15/17)  65 (H) (06/15/17)  0.9 (06/15/17)  5.8 (L) (06/15/17)  2.0 (L) (06/15/17)  139 (06/15/17)  4.0 (06/15/17)  108      Pending Labs     Order Current Status    BLOOD CULTURE P As your caregivers, we want you to be aware of the medications you are prescribed to take and their potential SIDE EFFECTS. Your nurse will review your medications with you before you are discharged, and can provide you with additional printed information. Ondansetron HCl (ZOFRAN) 8 MG tablet    Lansoprazole (PREVACID) 15 MG Oral Capsule Delayed Release       Use:  Nausea/vomiting, acid reflux, low bowel motility, stomach pain   Most common side effects:  Depends on the specific medication but generally inc Sodium Chloride, Hypertonic, (CRISTIAN 128) 5 % Ophthalmic Ointment

## (undated) NOTE — ED AVS SNAPSHOT
[facility] Emergency Department    [place]  [address] [place] [address]    Phone:  [phone]    Fax:  [phone]           Amina Gaspar   MRN: [id number]    Department:  [facility] Emergency Department   Date of Visit:  5/31/ IF THERE IS ANY CHANGE OR WORSENING OF YOUR CONDITION, CALL YOUR PRIMARY CARE PHYSICIAN AT ONCE OR RETURN IMMEDIATELY TO THE EMERGENCY DEPARTMENT.     If you have been prescribed any medication(s), please fill your prescription right away and begin taking t

## (undated) NOTE — IP AVS SNAPSHOT
[facility] [place] [address] [facility], [address] ~ [phone]                Discharge Summary   1/16/2017    Paulo MARCUM [facility]           Admission Information        Provider Department    1/16/2017 Bita Knight MD  8ne-A         T Commonly known as:  MYCOSTATIN   What changed:    - when to take this  - reasons to take this        Take 5 mL (500,000 Units total) by mouth 4 (four) times daily.     Zoraida Wood                             CONTINUE taking these medications        Inst PREVACID 15 MG Cpdr   Generic drug:  Lansoprazole   Next dose due:  1/18/17        1 CAPSULE DAILY BEFORE EATING                            Prochlorperazine Maleate 10 mg tablet   Commonly known as:  COMPAZINE        Take 1 tablet (10 mg total) by mouth e Feb 01, 2017  2:00 PM   FOLLOW UP with Edinson Cordoba MD   [facility] ([address])    [address] [place] [address]            Feb 02, 2017 10:00 AM   NON PHYSICIAN with ROSIE [phone]            Feb 27, 2017  4:00 PM   NON PHYSICIAN with [facility] ([address])    [address]            Mar 01, 2017  2:00 PM   FOLLOW UP with Marli Nicholson 12.4 (L) (01/11/17)  37.0 (01/11/17)  97.4 (12/15/16)  31.7 (12/15/16)  34.8  (01/11/17)  150 (12/28/16)  9.9      Recent Hematology Lab Results (cont.)  (Last 3 results in the past 90 days)    Neutrophil % Lymphocyte % Monocyte % Eosinophil % Basophil % P harming yourself, contact [facility] at [phone]. - If you don’t have insurance, Delroy Lynch has partnered with Patient [address] to help you get signed up for insurance coverage.   Patient [place] Most common side effects:  Allergic reactions, rash, nausea, diarrhea   What to report to your healthcare team: Tolerance of medications, temperature, rash, itching, shortness of breath, chills, nausea, and diarrhea           Blood Pressure and Cardiac Medi blood pressure, fluid retention, mood changes, stomach upset/pain, headache, dizziness           Anti-Histamines     Benadryl/Maalox/Lidocaine 1:1:1 solution         Use:  Treat Allergies   Most common side effects:  Drowsiness, dry mouth   What to report

## (undated) NOTE — IP AVS SNAPSHOT
Patient Demographics     Address  [address]  [place] [address] Phone  [phone] [facility]) *Preferred*  [phone] [facility]) E-mail Address  [email or web address]      Emergency Contact(s)     Name Relation Home Work [phone] Spouse [phone] [    ]   [    ]   [    ]   [    ]     furosemide 20 MG Tabs  Commonly known as:  LASIX      Take 1 tablet (20 mg total) by mouth daily.    Sundar Cote MD   [    ]   [    ]   [    ]   [    ]     HYDROcodone-acetaminophen 5-325 MG Tabs  Commonly know Commonly known as:  XARELTO      Take 1 tablet (20 mg total) by mouth daily with food. Zoraida Vargas PA-C   [    ]   [    ]   [    ]   [    ]     Astrid Oliveros  MG Tb24  Generic drug:  Metoprolol Succinate ER      Take 100 mg by mouth daily.  Take 1 tabl [id number] Pantoprazole Sodium (PROTONIX) EC tab 40 mg 06/24/17 0626 Given      [id number] Sodium Chloride (Hypertonic) (sodium chloride) 5 % ophthalmic ointment 1 drop 06/23/17 2027 Given      [id number] Vitamin B-12 (VITAMIN B12) tab 500 mcg 06/24/17 0956 H&P signed by Christiane Harrison DO at 6/19/2017  2:15 PM  Version 2 of 2    Author:  Christiane Harrison DO Service:  (none) Author Type:  Physician    Filed:  6/19/2017  2:15 PM Date of Service:  6/19/2017  1:29 PM Status:  Addendum    :  [place] Comment DENTAL IMPLANT    OTHER SURGICAL HISTORY  2011 X 2    Comment BLADDER CA RECURRENCE    INJECTION, W/WO CONTRAST, DX/THERAPEUTIC SUBSTANCE, EPIDURAL/SUBARACHNOID; LUMBAR/SACRAL N/A 8/15/2014    Comment Procedure: LUMBAR EPIDURAL;  Surgeon: Inder Ureña, Comment Smiley Sol. 2 adenomas, diverticuli, hemorrhoids, repeat 2019.     BACK SURGERY  2/2/15    Comment L4-L5 PPS TLIF     OTHER  3/14/2014    Comment [employer] LINQ LOOP RECORDER         ALL:  No Known Allergies       No current outpatient prescriptions on Chest wall:  No tenderness or deformity   Heart:  Regular rate and rhythm, S1, S2 normal, no murmur, rub or gallop appreciated   Abdomen:   Soft, non-tender. Bowel sounds normal. No masses,  No organomegaly.  Non distended   Extremities: Extremities normal, 6/12/2017  PROCEDURE:  CT OF THE SINUS (SCANNED AXIALLY AND CORONALLY) WITHOUT CONTRAST  COMPARISON:  None. INDICATIONS:  fevers, nasal congestion and tired. History of metastatic cancer.   TECHNIQUE:  Axial thin section noncontrast imaging performed throu acute sinusitis. 2. No lytic or blastic bony lesions. 3. Mild narrowing of the left ostiomeatal unit. 4. Leftward nasal septal deviation.     Dictated by: Estrada Irene MD on 6/12/2017 at 21:56     Approved by: Estrada Irene MD             Alcira Smith popliteal, sapheno-femoral junction, and posterior tibial veins. FINDINGS:  SAPHENOFEMORAL JUNCTION:  No reflux. THROMBI:  None visible. COMPRESSION:  Normal compressibility, phasicity, and augmentation. OTHER:  Negative.       6/18/2017  CONCLUSION:  Neg represents a cyst. Renal collecting systems are not dilated. ADRENALS:  Normal. AORTA/VASCULAR:  Normal. RETROPERITONEUM:  Normal. BOWEL/MESENTERY:  There is diverticulosis of the colon without evidence of acute diverticulitis. No free fluid.  No mesenteric stable subpleural micronodule of the ventral right middle lobe axial image 167 and a stable micronodule of the posterior left lower lobe axial image 177. No pleural or pericardial effusions are seen. The central airways are patent.  There is a dilated esoph 6/18/2017  PROCEDURE:  XR CHEST AP PORTABLE (CPT=71010)  TECHNIQUE:  AP chest radiograph was obtained. COMPARISON:  STEPHANIE XR CHEST AP PORTABLE  (CPT=71010), 6/12/2017, 20:40.   INDICATIONS:  general weakness  PATIENT STATED HISTORY: (As transcribed by T imaging), abdomen, and pelvis were obtained post- injection of non-ionic intravenous contrast material. Multi-planar reformatted/3-D images were created to optimize visualization of vascular anatomy. Dose reduction techniques were used.  Dose information i 6/18/2017  CONCLUSION:  Little change compare with previous CT scan from 3 days ago. Widespread hepatic metastases redemonstrated. No pulmonary embolism present. Other details above.     Dictated by: Nehemiah Kern MD on 6/18/2017 at 23:07     Approved by: Attribution information is not available for this note.              H&P signed by Lus Bloch, DO at 6/19/2017  1:44 PM  Version 1 of 2    Author:  Lus Bloch, DO Service:  (none) Author Type:  Physician    Filed:  6/19/2017  1:44 PM Date of Ser Comment BLADDER CA    OTHER SURGICAL HISTORY      Comment DENTAL IMPLANT    OTHER SURGICAL HISTORY  2011 X 2    Comment BLADDER CA RECURRENCE    INJECTION, W/WO CONTRAST, DX/THERAPEUTIC SUBSTANCE, EPIDURAL/SUBARACHNOID; LUMBAR/SACRAL N/A 8/15/2014    Comm Comment Smiley Sol. 2 adenomas, diverticuli, hemorrhoids, repeat 2019.     BACK SURGERY  2/2/15    Comment L4-L5 PPS TLIF     OTHER  3/14/2014    Comment [employer] LINQ LOOP RECORDER         ALL:  No Known Allergies       No current outpatient prescriptions on Attribution information is not available for this note. Consults - MD Consult Notes      Consults signed by Aubrie Anand MD at 6/22/2017  9:56 PM     Author:   Aubrie Anand MD Service:  (none) Author Type:  Physician    Filed:  6/22 week or so. He has become extremely needy and dependent and does not let his wife out of his sight. This is a significant change for him. Patient denies suicidal thoughts. He denies hopelessness. He mostly feels cheated and angry about his illness.   H Family History:   Family History   Problem Relation Age of Onset   • Cancer Mother      Colon Cancer   • Cancer Sister      Breast Cancer   • Cancer Maternal Grandmother      Breast Cancer   • Hypertension Sister      Controlled   • Hypertension Sister mirtazapine (REMERON) tab 7.5 mg 7.5 mg Oral Nightly   gabapentin (NEURONTIN) cap 100 mg 100 mg Oral TID PRN   diphenhydrAMINE (BENADRYL) cap/tab 25 mg 25 mg Oral Nightly PRN   metoprolol Tartrate (LOPRESSOR) 5 MG/5ML injection 5 mg 5 mg Intravenous Q6H Standing Status: Standing  Number of Occurrences: 1  Urinalysis with Culture Reflex Once  Standing Status: Standing  Number of Occurrences: 1  TSH W Reflex To Free T4  Standing Status: Standing  Number of Occurrences: 1  Potassium  Standing Status: Standin Attribution information is not available for this note. D/C Summary     No notes of this type exist for this encounter.          Physical Therapy Notes (last 72 hours) (Notes from 6/21/2017 12:12 PM through 6/24/2017 12:12 PM)      Nakita Regan Past Surgical History    COLONOSCOPY  2008    Comment NL    COLONOSCOPY  2011    Comment DIVERTICULOSIS-=- NO POLYP    COLONOSCOPY  2005 AND 2003    Comment POLYPS    OTHER SURGICAL HISTORY  2002-4    Comment BLADDER CA    OTHER SURGICAL HISTORY      Comme Comment Procedure: LUMBAR EPIDURAL;  Surgeon: Stacey Ramirez MD;  Location: [facility]    COLONOSCOPY  11/12/14 - PRAKASH Goyal    UPPER GI ENDOSCOPY - REFERRAL  11/12/14 - PRAKASH Goyal    Comment normal EGD    COLONOSCOPY N/A 11/12/2014    Vinicio Assistive Device: Rolling walker  Pattern: Shuffle (decreased leah)  Stoop/Curb Assistance: Not tested  Comment : Scores per FIMS scale per dept policy    Skilled Therapy Provided: RN approved session. Pt received seated in BS chair spouse present.  Pt from continued IP PT, so that patient may achieve highest functional independence/return to baseline. Subacute rehab is recommended for 12-14 days.   After this period of rehabilitation patient should achieve supervision level in bed mob, transfers, stair with mets to liver and bone. Started on a new drug for CA and patient experiencing adverse effects to medication. Pt with recent discharge from [facility] on Friday June 16th due to similar symptoms.    Problem List  Principal Problem:    Weakness gene Location: [facility]    INJECTION, W/WO CONTRAST, DX/THERAPEUTIC SUBSTANCE, EPIDURAL/SUBARACHNOID; LUMBAR/SACRAL N/A 9/8/2014    Comment Procedure: LUMBAR EPIDURAL;  Surgeon: Lennice Felty, MD;  Location: [address] [place] [address] ACTIVITY TOLERANCE  Room air  No shortness of breath    AM-PAC '6-Clicks' INPATIENT SHORT FORM - BASIC MOBILITY  How much difficulty does the patient currently have. ..  -   Turning over in bed (including adjusting bedclothes, sheets and blankets)?: None stability. Pt fatigued following gait and ex. Continue to recommend home with [facility] PT upon d/c from [facility]. Pt continue to present with deficits for dec activity tolerance, dec balance, dec functional strength all below baseline.  Pt will continue to benefit from Number of Visits to Meet Established Goals: 5    Presenting Problem: dehydration, weakness, anemia    :   History related to current admission:   Admitted to ED 6/18 with increasing weakness and poor appetite.  Pt is s/p EDW DC on 6/16/17, admitted for diar Comment Procedure: LUMBAR EPIDURAL;  Surgeon: Chiara Ramirez MD;  Location: [address] [place] [address] NDL/CATH SPI DX/THER Sam Tony Salomón 85 N/A 8/15/2014    Comment Procedure: LUMBAR EPIDURAL;  Surgeon: Chiara Ramirez MD;  Location: [facility] Patient self-stated goal is to have less pain    OBJECTIVE  Precautions: None    WEIGHT BEARING RESTRICTION  Weight Bearing Restriction: None                PAIN ASSESSMENT  Rating: 10  Location: L chest  Management Techniques: Relaxation;Repositioning spouse, patient on expected course and goals while in [place]IE. Spouse in agreement, pt did not express any opinion. Pain subsided after pt seated in chair, LUE elevated on pillow. Nurse and CM updated.   Patient End of Session: Up in chair;Needs met;Call light judgement/safety--progressing    UE Exercise Program Goal  Patient will be modified independent with bilateral AROM HEP (home exercise program). -progressing    Additional Goals:  Patient will demosntrate knowledge of energy conservation techniques during A

## (undated) NOTE — IP AVS SNAPSHOT
[address]            (For Outpatient Use Only) Initial Admit Date: 6/18/2017   Inpt/Obs Admit Date: Inpt: 6/19/17 / Obs: N/A   Discharge Date:    Hospital Acct:  [de-identified]   MRN: [de-identified]   CSN: [id number]        ENCOUNTER  Patient Hospital Account Financial Class: Medicare    June 24, 2017